# Patient Record
Sex: FEMALE | Race: WHITE | NOT HISPANIC OR LATINO | Employment: FULL TIME | URBAN - METROPOLITAN AREA
[De-identification: names, ages, dates, MRNs, and addresses within clinical notes are randomized per-mention and may not be internally consistent; named-entity substitution may affect disease eponyms.]

---

## 2017-10-10 ENCOUNTER — APPOINTMENT (EMERGENCY)
Dept: RADIOLOGY | Facility: HOSPITAL | Age: 52
End: 2017-10-10
Payer: OTHER MISCELLANEOUS

## 2017-10-10 ENCOUNTER — HOSPITAL ENCOUNTER (EMERGENCY)
Facility: HOSPITAL | Age: 52
Discharge: HOME | End: 2017-10-10
Attending: EMERGENCY MEDICINE | Admitting: EMERGENCY MEDICINE
Payer: OTHER MISCELLANEOUS

## 2017-10-10 VITALS
DIASTOLIC BLOOD PRESSURE: 93 MMHG | OXYGEN SATURATION: 96 % | WEIGHT: 125 LBS | RESPIRATION RATE: 18 BRPM | SYSTOLIC BLOOD PRESSURE: 159 MMHG | TEMPERATURE: 98.5 F | HEART RATE: 92 BPM

## 2017-10-10 DIAGNOSIS — S61.411A LACERATION OF RIGHT HAND: Primary | ICD-10-CM

## 2017-10-10 PROCEDURE — 99283 EMERGENCY DEPT VISIT LOW MDM: CPT

## 2017-10-10 PROCEDURE — 73130 X-RAY EXAM OF HAND: CPT | Performed by: EMERGENCY MEDICINE

## 2017-10-10 PROCEDURE — 90471 IMMUNIZATION ADMIN: CPT

## 2017-10-10 PROCEDURE — 90715 TDAP VACCINE 7 YRS/> IM: CPT | Performed by: NURSE PRACTITIONER

## 2017-10-10 RX ORDER — LIDOCAINE HYDROCHLORIDE 10 MG/ML
5 INJECTION, SOLUTION EPIDURAL; INFILTRATION; INTRACAUDAL; PERINEURAL ONCE
Status: COMPLETED | OUTPATIENT
Start: 2017-10-10 | End: 2017-10-10

## 2017-10-10 RX ORDER — GINSENG 100 MG
1 CAPSULE ORAL ONCE
Status: COMPLETED | OUTPATIENT
Start: 2017-10-10 | End: 2017-10-10

## 2017-10-10 RX ADMIN — LIDOCAINE HYDROCHLORIDE 5 ML: 10 INJECTION, SOLUTION EPIDURAL; INFILTRATION; INTRACAUDAL; PERINEURAL at 01:33

## 2017-10-10 RX ADMIN — TETANUS TOXOID, REDUCED DIPHTHERIA TOXOID AND ACELLULAR PERTUSSIS VACCINE, ADSORBED 0.5 ML: 5; 2.5; 8; 8; 2.5 SUSPENSION INTRAMUSCULAR at 02:06

## 2017-10-10 RX ADMIN — BACITRACIN ZINC 1 LARGE APPLICATION: 500 OINTMENT TOPICAL at 02:04

## 2017-10-10 NOTE — DISCHARGE INSTRUCTIONS
You are to have sutures removed in 7-10 days  Keep clean and dry  Apply bacitracin for 3 days only  See work compensation physician for follow up       Laceration   WHAT YOU NEED TO KNOW:   A laceration is an injury to the skin and the soft tissue underneath it  Lacerations happen when you are cut or hit by something  They can happen anywhere on the body  DISCHARGE INSTRUCTIONS:   Return to the emergency department if:   · You have heavy bleeding or bleeding that does not stop after 10 minutes of holding firm, direct pressure over the wound  · Your wound opens up  Contact your healthcare provider if:   · You have a fever or chills  · Your laceration is red, warm, or swollen  · You have red streaks on your skin coming from your wound  · You have white or yellow drainage from the wound that smells bad  · You have pain that gets worse, even after treatment  · You have questions or concerns about your condition or care  Medicines:   · Prescription pain medicine  may be given  Ask how to take this medicine safely  · Antibiotics  help treat or prevent a bacterial infection  · Take your medicine as directed  Contact your healthcare provider if you think your medicine is not helping or if you have side effects  Tell him or her if you are allergic to any medicine  Keep a list of the medicines, vitamins, and herbs you take  Include the amounts, and when and why you take them  Bring the list or the pill bottles to follow-up visits  Carry your medicine list with you in case of an emergency  Care for your wound as directed:   · Do not get your wound wet  until your healthcare provider says it is okay  Do not soak your wound in water  Do not go swimming until your healthcare provider says it is okay  Carefully wash the wound with soap and water  Gently pat the area dry or allow it to air dry  · Change your bandages  when they get wet, dirty, or after washing   Apply new, clean bandages as directed  Do not apply elastic bandages or tape too tight  Do not put powders or lotions over your incision  · Apply antibiotic ointment as directed  Your healthcare provider may give you antibiotic ointment to put over your wound if you have stitches  If you have strips of tape over your incision, let them dry up and fall off on their own  If they do not fall off within 14 days, gently remove them  If you have glue over your wound, do not remove or pick at it  If your glue comes off, do not replace it with glue that you have at home  · Check your wound every day for signs of infection such as swelling, redness, or pus  Self-care:   · Apply ice  on your wound for 15 to 20 minutes every hour or as directed  Use an ice pack, or put crushed ice in a plastic bag  Cover it with a towel  Ice helps prevent tissue damage and decreases swelling and pain  · Use a splint as directed  A splint will decrease movement and stress on your wound  It may help it heal faster  A splint may be used for lacerations over joints or areas of your body that bend  Ask your healthcare provider how to apply and remove a splint  · Decrease scarring of your wound  by applying ointments as directed  Do not apply ointments until your healthcare provider says it is okay  You may need to wait until your wound is healed  Ask which ointment to buy and how often to use it  After your wound is healed, use sunscreen over the area when you are out in the sun  You should do this for at least 6 months to 1 year after your injury  Follow up with your healthcare provider as directed: You may need to follow up in 24 to 48 hours to have your wound checked for infection  You will need to return in 3 to 14 days if you have stitches or staples so they can be removed  Care for your wound as directed to prevent infection and help it heal  Write down your questions so you remember to ask them during your visits    © 2017 [employer]  Information is for End User's use only and may not be sold, redistributed or otherwise used for commercial purposes  All illustrations and images included in CareNotes® are the copyrighted property of A D A M [employer] Inc  or [employer]  The above information is an  only  It is not intended as medical advice for individual conditions or treatments  Talk to your doctor, nurse or pharmacist before following any medical regimen to see if it is safe and effective for you

## 2017-10-10 NOTE — ED PROVIDER NOTES
History  Chief Complaint   Patient presents with    Hand Laceration     Pt states "I cut my hand on glass at work" Pt unsure of last tetanus     This is a 46year old female who was at work and was working on a bottle line and a bottle broke and cut her right hand  She states she had a glove on the left hand but no glove on the right hand  Unknown tetanus  She states that she does have a piece of metal in her right hand from years ago  History provided by:  Patient   used: No        None       History reviewed  No pertinent past medical history  History reviewed  No pertinent surgical history  History reviewed  No pertinent family history  I have reviewed and agree with the history as documented  Social History   Substance Use Topics    Smoking status: Current Every Day Smoker    Smokeless tobacco: Never Used    Alcohol use Yes      Comment: occasioally        Review of Systems   Constitutional: Negative  HENT: Negative  Eyes: Negative  Respiratory: Negative  Cardiovascular: Negative  Gastrointestinal: Negative  Endocrine: Negative  Genitourinary: Negative  Musculoskeletal: Negative  Skin: Positive for wound  Allergic/Immunologic: Negative  Neurological: Negative  Hematological: Negative  Psychiatric/Behavioral: Negative  Physical Exam  ED Triage Vitals [10/10/17 0104]   Temperature Pulse Respirations Blood Pressure SpO2   98 5 °F (36 9 °C) 92 18 159/93 96 %      Temp src Heart Rate Source Patient Position - Orthostatic VS BP Location FiO2 (%)   -- -- -- -- --      Pain Score       No Pain           Physical Exam   Constitutional: She is oriented to person, place, and time  She appears well-developed and well-nourished  No distress  HENT:   Head: Normocephalic and atraumatic  Eyes: EOM are normal  Pupils are equal, round, and reactive to light  Neck: Normal range of motion  Neck supple     Musculoskeletal: Normal range of motion  She exhibits no edema, tenderness or deformity  Neurological: She is alert and oriented to person, place, and time  She displays normal reflexes  No cranial nerve deficit or sensory deficit  She exhibits normal muscle tone  Coordination normal    Skin: Skin is warm  Capillary refill takes less than 2 seconds  She is not diaphoretic  3 cm laceration on the dorsal surface of the right hand  Extending from base of index finger to base of thumb  Psychiatric: She has a normal mood and affect  Her behavior is normal  Judgment and thought content normal    Nursing note and vitals reviewed  ED Medications  Medications   bacitracin topical ointment 1 large application (not administered)   tetanus-diphtheria-acellular pertussis (BOOSTRIX) IM injection 0 5 mL (not administered)   lidocaine (PF) (XYLOCAINE-MPF) 1 % injection 5 mL (5 mL Infiltration Given by Other 10/10/17 0133)       Diagnostic Studies  Labs Reviewed - No data to display    XR hand 3+ views RIGHT    (Results Pending)       Procedures  Lac Repair  Date/Time: 10/10/2017 1:51 AM  Performed by: Anibal Vick by: Dhiraj Muñoz     Patient location:  ED and bedside  Other Assisting Provider: No    Consent:     Consent obtained:  Verbal, written and emergent situation    Consent given by:  Patient    Risks discussed:  Infection, need for additional repair, pain, poor cosmetic result, retained foreign body, tendon damage, vascular damage, poor wound healing and nerve damage    Alternatives discussed:  No treatment, delayed treatment, observation and referral  Universal protocol:     Procedure explained and questions answered to patient or proxy's satisfaction: yes      Relevant documents present and verified: yes      Test results available and properly labeled: yes      Imaging studies available: yes (no FB seen in the laceration   There is a FB on lateral side of hand not related to this incident )      Required blood products, implants, devices, and special equipment available: yes      Site/side marked: yes      Immediately prior to procedure, a time out was called: yes      Patient identity confirmed:  Verbally with patient, arm band and hospital-assigned identification number  Anesthesia (see MAR for exact dosages): Anesthesia method:  Local infiltration    Local anesthetic:  Lidocaine 1% w/o epi  Laceration details:     Location:  Hand    Hand location:  R hand, dorsum    Length (cm) of Repair:  3    Depth (mm):  0 5  Repair type:     Repair type:  Simple  Pre-procedure details:     Preparation:  Patient was prepped and draped in usual sterile fashion and imaging obtained to evaluate for foreign bodies  Exploration:     Hemostasis achieved with:  Direct pressure    Wound exploration: wound explored through full range of motion and entire depth of wound probed and visualized      Contaminated: no    Treatment:     Area cleansed with:  Betadine and saline    Amount of cleaning:  Standard    Irrigation solution:  Sterile saline    Irrigation method:  Syringe    Visualized foreign bodies/material removed: no    Skin repair:     Repair method:  Sutures    Suture size:  4-0    Suture material:  Nylon    Suture technique:  Simple interrupted    Number of sutures:  7  Approximation:     Approximation:  Close    Approximation difficulty:  Simple    Vermilion border: well-aligned    Post-procedure details:     Dressing:  Antibiotic ointment    Patient tolerance of procedure:   Tolerated well, no immediate complications          Phone Contacts  ED Phone Contact    ED Course  ED Course                                MDM  Number of Diagnoses or Management Options  Diagnosis management comments: Laceration right hand    Suture repair  Boostrix  Xray   Occ Med at bedside        Amount and/or Complexity of Data Reviewed  Tests in the radiology section of CPT®: ordered and reviewed      CritCare Time    Disposition  Final diagnoses:   Laceration of right hand     ED Disposition     ED Disposition Condition Comment    Discharge  Mat Singh discharge to home/self care  Condition at discharge: Good        Follow-up Information     Follow up With Specialties Details Why Contact Info Additional Information       Follow up with Occupational medicine as work instructed       [facility]  If symptoms worsen [address] [facility] ED, [address] [facility] [address]  , [place], [place], [address]        Patient's Medications    No medications on file     No discharge procedures on file      ED Provider  Electronically Signed by       MELA Meeks  10/10/17 0205

## 2017-10-21 ENCOUNTER — OFFICE VISIT (OUTPATIENT)
Dept: URGENT CARE | Facility: CLINIC | Age: 52
End: 2017-10-21
Payer: OTHER MISCELLANEOUS

## 2017-10-21 PROCEDURE — 99211 OFF/OP EST MAY X REQ PHY/QHP: CPT

## 2020-06-23 ENCOUNTER — TELEPHONE (OUTPATIENT)
Dept: FAMILY MEDICINE CLINIC | Facility: CLINIC | Age: 55
End: 2020-06-23

## 2020-06-23 ENCOUNTER — OFFICE VISIT (OUTPATIENT)
Dept: URGENT CARE | Facility: CLINIC | Age: 55
End: 2020-06-23
Payer: COMMERCIAL

## 2020-06-23 VITALS
OXYGEN SATURATION: 97 % | RESPIRATION RATE: 18 BRPM | SYSTOLIC BLOOD PRESSURE: 164 MMHG | HEART RATE: 96 BPM | HEIGHT: 69 IN | TEMPERATURE: 97.7 F | BODY MASS INDEX: 23.7 KG/M2 | WEIGHT: 160 LBS | DIASTOLIC BLOOD PRESSURE: 98 MMHG

## 2020-06-23 DIAGNOSIS — R53.83 FATIGUE, UNSPECIFIED TYPE: ICD-10-CM

## 2020-06-23 DIAGNOSIS — I10 ESSENTIAL HYPERTENSION: Primary | ICD-10-CM

## 2020-06-23 PROCEDURE — 99203 OFFICE O/P NEW LOW 30 MIN: CPT | Performed by: FAMILY MEDICINE

## 2020-06-23 RX ORDER — LISINOPRIL 20 MG/1
20 TABLET ORAL DAILY
Qty: 15 TABLET | Refills: 0 | Status: SHIPPED | OUTPATIENT
Start: 2020-06-23 | End: 2020-07-01 | Stop reason: SDUPTHER

## 2020-07-01 ENCOUNTER — OFFICE VISIT (OUTPATIENT)
Dept: FAMILY MEDICINE CLINIC | Facility: CLINIC | Age: 55
End: 2020-07-01
Payer: COMMERCIAL

## 2020-07-01 VITALS
RESPIRATION RATE: 18 BRPM | DIASTOLIC BLOOD PRESSURE: 74 MMHG | HEART RATE: 94 BPM | SYSTOLIC BLOOD PRESSURE: 126 MMHG | OXYGEN SATURATION: 97 % | BODY MASS INDEX: 23.99 KG/M2 | WEIGHT: 162 LBS | HEIGHT: 69 IN | TEMPERATURE: 97.6 F

## 2020-07-01 DIAGNOSIS — Z78.0 ASYMPTOMATIC POSTMENOPAUSAL STATE: ICD-10-CM

## 2020-07-01 DIAGNOSIS — Z12.31 ENCOUNTER FOR SCREENING MAMMOGRAM FOR BREAST CANCER: ICD-10-CM

## 2020-07-01 DIAGNOSIS — Z23 ENCOUNTER FOR IMMUNIZATION: ICD-10-CM

## 2020-07-01 DIAGNOSIS — Z00.00 ANNUAL PHYSICAL EXAM: Primary | ICD-10-CM

## 2020-07-01 DIAGNOSIS — Z12.12 SCREENING FOR COLORECTAL CANCER: ICD-10-CM

## 2020-07-01 DIAGNOSIS — Z11.59 ENCOUNTER FOR HEPATITIS C SCREENING TEST FOR LOW RISK PATIENT: ICD-10-CM

## 2020-07-01 DIAGNOSIS — Z12.39 SCREENING FOR BREAST CANCER: ICD-10-CM

## 2020-07-01 DIAGNOSIS — Z12.11 SCREENING FOR COLORECTAL CANCER: ICD-10-CM

## 2020-07-01 DIAGNOSIS — Z11.4 SCREENING FOR HIV (HUMAN IMMUNODEFICIENCY VIRUS): ICD-10-CM

## 2020-07-01 DIAGNOSIS — Z72.0 TOBACCO ABUSE: ICD-10-CM

## 2020-07-01 DIAGNOSIS — Z12.11 SCREENING FOR COLON CANCER: ICD-10-CM

## 2020-07-01 DIAGNOSIS — R68.89 HEAT INTOLERANCE: ICD-10-CM

## 2020-07-01 DIAGNOSIS — I10 ESSENTIAL HYPERTENSION: ICD-10-CM

## 2020-07-01 DIAGNOSIS — E55.9 VITAMIN D DEFICIENCY: ICD-10-CM

## 2020-07-01 DIAGNOSIS — Z11.59 NEED FOR HEPATITIS C SCREENING TEST: ICD-10-CM

## 2020-07-01 PROCEDURE — 3008F BODY MASS INDEX DOCD: CPT | Performed by: INTERNAL MEDICINE

## 2020-07-01 PROCEDURE — 90732 PPSV23 VACC 2 YRS+ SUBQ/IM: CPT

## 2020-07-01 PROCEDURE — 3074F SYST BP LT 130 MM HG: CPT | Performed by: INTERNAL MEDICINE

## 2020-07-01 PROCEDURE — 3078F DIAST BP <80 MM HG: CPT | Performed by: INTERNAL MEDICINE

## 2020-07-01 PROCEDURE — 99214 OFFICE O/P EST MOD 30 MIN: CPT | Performed by: INTERNAL MEDICINE

## 2020-07-01 PROCEDURE — 90471 IMMUNIZATION ADMIN: CPT

## 2020-07-01 PROCEDURE — 99386 PREV VISIT NEW AGE 40-64: CPT | Performed by: INTERNAL MEDICINE

## 2020-07-01 PROCEDURE — 4004F PT TOBACCO SCREEN RCVD TLK: CPT | Performed by: INTERNAL MEDICINE

## 2020-07-01 RX ORDER — LISINOPRIL 20 MG/1
20 TABLET ORAL DAILY
Qty: 90 TABLET | Refills: 1 | Status: SHIPPED | OUTPATIENT
Start: 2020-07-01 | End: 2020-08-05 | Stop reason: SDUPTHER

## 2020-07-01 NOTE — PROGRESS NOTES
[facility]    NAME: Edgardo Moe  AGE: 47 y o  SEX: female  : 1965     DATE: 2020     Assessment and Plan:     Problem List Items Addressed This Visit     None      Visit Diagnoses     Annual physical exam    -  Primary    Screening for colon cancer        Encounter for hepatitis C screening test for low risk patient        Screening for breast cancer        Essential hypertension        Relevant Medications    lisinopril (ZESTRIL) 20 mg tablet    Other Relevant Orders    Lipid panel    Hemoglobin A1C    CBC and Platelet    Comprehensive metabolic panel    UA (URINE) with reflex to Scope    Heat intolerance        Relevant Orders    TSH, 3rd generation with Free T4 reflex    Vitamin D deficiency        Relevant Orders    Vitamin D 25 hydroxy    Encounter for screening mammogram for breast cancer        Relevant Orders    Mammo screening bilateral w 3d & cad    Screening for colorectal cancer        Relevant Orders    Cologuard    Screening for HIV (human immunodeficiency virus)        Relevant Orders    HIV 1/2 Antigen/Antibody (4th Generation) w Reflex [facility]    Need for hepatitis C screening test        Relevant Orders    Hepatitis C antibody    Asymptomatic postmenopausal state        Relevant Orders    DXA bone density spine hip and pelvis    Encounter for immunization        Relevant Orders    PNEUMOCOCCAL POLYSACCHARIDE VACCINE 23-VALENT =>1YO SQ IM (Completed)          Immunizations and preventive care screenings were discussed with patient today  Appropriate education was printed on patient's after visit summary  Counseling:  Alcohol/drug use: discussed moderation in alcohol intake, the recommendations for healthy alcohol use, and avoidance of illicit drug use  Dental Health: discussed importance of regular tooth brushing, flossing, and dental visits    Injury prevention: discussed safety/seat belts, safety helmets, smoke detectors, carbon dioxide detectors, and smoking near bedding or upholstery  Sexual health: discussed sexually transmitted diseases, partner selection, use of condoms, avoidance of unintended pregnancy, and contraceptive alternatives  · Exercise: the importance of regular exercise/physical activity was discussed  Recommend exercise 3-5 times per week for at least 30 minutes  Tobacco Cessation Counseling: Tobacco cessation counseling was provided  The patient is sincerely urged to quit consumption of tobacco  She is not ready to quit tobacco  Medication options and side effects of medication discussed  Patient refused medication  Tobacco use screening or tobacco cessation counseling was not performed due to other medical reasons  Not ready to quit at this time      Return in about 1 month (around 8/1/2020) for pap  Chief Complaint:     Chief Complaint   Patient presents with   [address] Hypertension    Colonoscopy     never had a colonoscopy done before interested in cologuard       History of Present Illness:     Adult Annual Physical   Patient here for a comprehensive physical exam  The patient reports problems - refer to separate progress note  Diet and Physical Activity  · Diet/Nutrition: poor diet  · Exercise: no formal exercise  Depression Screening  PHQ-9 Depression Screening    PHQ-9:    Frequency of the following problems over the past two weeks:       Little interest or pleasure in doing things:  0 - not at all  Feeling down, depressed, or hopeless:  0 - not at all  PHQ-2 Score:  0       General Health  · Sleep: sleeps well  · Hearing: normal - bilateral   · Vision: goes for regular eye exams  · Dental: regular dental visits  /GYN Health  · Patient is: postmenopausal  · Last menstrual period: 5 yrs ago  · Contraceptive method: abstinence       Review of Systems:     Review of Systems   Refer to separate progress note     Past Medical History: Past Medical History:   Diagnosis Date    Anxiety     Hypertension       Past Surgical History:     Past Surgical History:   Procedure Laterality Date    BREAST LUMPECTOMY      EYE SURGERY      TUBAL LIGATION        Social History:        Social History     Socioeconomic History    Marital status: Single     Spouse name: None    Number of children: None    Years of education: None    Highest education level: None   Occupational History    None   Social Needs    Financial resource strain: None    Food insecurity:     Worry: None     Inability: None    Transportation needs:     Medical: None     Non-medical: None   Tobacco Use    Smoking status: Current Every Day Smoker     Packs/day: 2 00    Smokeless tobacco: Never Used   Substance and Sexual Activity    Alcohol use: Yes     Comment: 3 beer/day    Drug use: No    Sexual activity: None   Lifestyle    Physical activity:     Days per week: None     Minutes per session: None    Stress: None   Relationships    Social connections:     Talks on phone: None     Gets together: None     Attends [employer] service: None     Active member of club or organization: None     Attends meetings of clubs or organizations: None     Relationship status: None    Intimate partner violence:     Fear of current or ex partner: None     Emotionally abused: None     Physically abused: None     Forced sexual activity: None   Other Topics Concern    None   Social History Narrative    None      Family History:     Family History   Problem Relation Age of Onset    Heart disease Mother     Hypertension Mother     Cancer Mother     Heart disease Father     Hypertension Father     Aneurysm Father       Current Medications:     Current Outpatient Medications   Medication Sig Dispense Refill    lisinopril (ZESTRIL) 20 mg tablet Take 1 tablet (20 mg total) by mouth daily 90 tablet 1     No current facility-administered medications for this visit         Allergies:     No Known Allergies   Physical Exam:     /74   Pulse 94   Temp 97 6 °F (36 4 °C)   Resp 18   Ht 5' 9" (1 753 m)   Wt 73 5 kg (162 lb)   SpO2 97%   BMI 23 92 kg/m²     Physical Exam  Refer to separate progress note    Yogi Pham MD  [place] [address]

## 2020-07-01 NOTE — PROGRESS NOTES
[facility]        NAME: Adriano Gates is a 47 y o  female  : 1965    MRN: [id number]  DATE: 2020  TIME: 5:10 AM    Assessment and Plan     Problem List Items Addressed This Visit        Cardiovascular and Mediastinum    Essential hypertension - Primary    Bp improved today, continue lisinopril  Check labs for risk assessment  Discussed lifestyle changes like smoking cessation, limiting alcohol, low sodium diet  Relevant Medications    lisinopril (ZESTRIL) 20 mg tablet    Other Relevant Orders    Lipid panel    Hemoglobin A1C    CBC and Platelet    Comprehensive metabolic panel    UA (URINE) with reflex to Scope       Other    Tobacco abuse  Not interested in quitting at this time  40+ pack year history, qualifies for LDCT lung cancer screening once she turns 49yo (December), will order at follow up         Other Visit Diagnoses     Screening for colon cancer        Encounter for hepatitis C screening test for low risk patient        Screening for breast cancer        Heat intolerance        Relevant Orders    TSH, 3rd generation with Free T4 reflex    Vitamin D deficiency        Relevant Orders    Vitamin D 25 hydroxy    Annual physical exam        Encounter for screening mammogram for breast cancer        Relevant Orders    Mammo screening bilateral w 3d & cad    Screening for colorectal cancer        Relevant Orders    Cologuard    Screening for HIV (human immunodeficiency virus)        Relevant Orders    HIV 1/2 Antigen/Antibody (4th Generation) w Reflex [facility]    Need for hepatitis C screening test        Relevant Orders    Hepatitis C antibody    Asymptomatic postmenopausal state        Relevant Orders    DXA bone density spine hip and pelvis    Encounter for immunization        Relevant Orders    PNEUMOCOCCAL POLYSACCHARIDE VACCINE 23-VALENT =>3YO SQ IM (Completed)               Chief Complaint     Chief Complaint   Patient presents with    Establish Care    Hypertension  Colonoscopy     never had a colonoscopy done before interested in cologuard          History of Present Illness       Here as new patient to establish care  Previous Dr Ismael Metzger patient  HTN: diagnosed about 10 yrs ago during dental visit  Hasn't seen PCP in years, ran out of lisinopril  Was having headaches, dizziness, diaphoresis last week and went to urgent care, Bp was elevated 190-200s  Pt was given limited supply of lisinopril and referred here  Feeling better since restarting, denies chest pain, dyspnea, leg swelling or SOB  Fhx notable for HTN and heart disease in both parents  Rt breast lumpectomy for benign lesion age 25s  Has never had screening mammogram      Social hx: smokes 1 5ppd since age 6yo  Has tried to quit with gum, patches, and chantix without success  Not interested in quitting at this time  Drinks 2-3 beers daily  Works at [employer]  Denies illicit drug use  Not sexually active currently, prefers men only  Health maintenance: has never had screening for colon cancer, no known family history  Last pap smear > 10 yrs ago  Review of Systems   Review of Systems   Constitutional: Negative for chills, fatigue, fever and unexpected weight change  HENT: Negative for congestion, postnasal drip, rhinorrhea, sore throat and trouble swallowing  Eyes: Negative for pain, redness, itching and visual disturbance  Respiratory: Negative for cough, chest tightness, shortness of breath and wheezing  Cardiovascular: Negative for chest pain, palpitations and leg swelling  Gastrointestinal: Negative for abdominal pain, blood in stool, constipation, diarrhea and nausea  Endocrine: Negative for cold intolerance, heat intolerance, polydipsia and polyuria  Genitourinary: Negative for dysuria, frequency, hematuria and urgency  Musculoskeletal: Positive for arthralgias and joint swelling  Negative for back pain  Skin: Negative for rash     Neurological: Negative for dizziness, tremors, weakness, light-headedness, numbness and headaches  Psychiatric/Behavioral: Negative for confusion, dysphoric mood, hallucinations and suicidal ideas  The patient is not nervous/anxious  Current Medications       Current Outpatient Medications:     lisinopril (ZESTRIL) 20 mg tablet, Take 1 tablet (20 mg total) by mouth daily, Disp: 90 tablet, Rfl: 1    Current Allergies     Allergies as of 07/01/2020    (No Known Allergies)            The following portions of the patient's history were reviewed and updated as appropriate: allergies, current medications, past family history, past medical history, past social history, past surgical history and problem list      Past Medical History:   Diagnosis Date    Anxiety     Hypertension        Past Surgical History:   Procedure Laterality Date    BREAST LUMPECTOMY      EYE SURGERY      TUBAL LIGATION         Family History   Problem Relation Age of Onset    Heart disease Mother     Hypertension Mother     Cancer Mother     Heart disease Father     Hypertension Father     Aneurysm Father          Medications have been verified  Objective   /74   Pulse 94   Temp 97 6 °F (36 4 °C)   Resp 18   Ht 5' 9" (1 753 m)   Wt 73 5 kg (162 lb)   SpO2 97%   BMI 23 92 kg/m²        Physical Exam     Physical Exam   Constitutional: She is oriented to person, place, and time  She appears well-developed and well-nourished  No distress  HENT:   Head: Normocephalic and atraumatic  Right Ear: External ear normal    Left Ear: External ear normal    Mouth/Throat: Oropharynx is clear and moist  No oropharyngeal exudate  Eyes: Conjunctivae and EOM are normal  No scleral icterus  Neck: Normal range of motion  Neck supple  No JVD present  No thyromegaly present  Cardiovascular: Normal rate, regular rhythm, normal heart sounds and intact distal pulses  Exam reveals no gallop and no friction rub  No murmur heard    Pulmonary/Chest: Effort normal and breath sounds normal  No respiratory distress  She has no wheezes  She has no rales  She exhibits no tenderness  Abdominal: Soft  Bowel sounds are normal  She exhibits no distension and no mass  There is no tenderness  There is no rebound and no guarding  No hernia  Musculoskeletal: Normal range of motion  She exhibits tenderness and deformity  Bony enlargement of PIP & DIPs bilateral hands, hallux valgus deformity of left foot   Lymphadenopathy:     She has no cervical adenopathy  Neurological: She is alert and oriented to person, place, and time  Skin: Skin is warm and dry  Capillary refill takes less than 2 seconds  Psychiatric: She has a normal mood and affect   Her behavior is normal  Judgment and thought content normal            Results:  No results found for: SODIUM, K, CL, CO2, BUN, CREATININE, GLUC, CALCIUM    No results found for: HGBA1C    No results found for: WBC, HGB, HCT, MCV, PLT

## 2020-07-01 NOTE — PATIENT INSTRUCTIONS
Wellness Visit for Adults   AMBULATORY CARE:   A wellness visit  is when you see your healthcare provider to get screened for health problems  You can also get advice on how to stay healthy  Write down your questions so you remember to ask them  Ask your healthcare provider how often you should have a wellness visit  What happens at a wellness visit:  Your healthcare provider will ask about your health, and your family history of health problems  This includes high blood pressure, heart disease, and cancer  He or she will ask if you have symptoms that concern you, if you smoke, and about your mood  You may also be asked about your intake of medicines, supplements, food, and alcohol  Any of the following may be done:  · Your weight  will be checked  Your height may also be checked so your body mass index (BMI) can be calculated  Your BMI shows if you are at a healthy weight  · Your blood pressure  and heart rate will be checked  Your temperature may also be checked  · Blood and urine tests  may be done  Blood tests may be done to check your cholesterol levels  Abnormal cholesterol levels increase your risk for heart disease and stroke  You may also need a blood or urine test to check for diabetes if you are at increased risk  Urine tests may be done to look for signs of an infection or kidney disease  · A physical exam  includes checking your heartbeat and lungs with a stethoscope  Your healthcare provider may also check your skin to look for sun damage  · Screening tests  may be recommended  A screening test is done to check for diseases that may not cause symptoms  The screening tests you may need depend on your age, gender, family history, and lifestyle habits  For example, colorectal screening may be recommended if you are 48years old or older  Screening tests you need if you are a woman:   · A Pap smear  is used to screen for cervical cancer   Pap smears are usually done every 3 to 5 years depending on your age  You may need them more often if you have had abnormal Pap smear test results in the past  Ask your healthcare provider how often you should have a Pap smear  · A mammogram  is an x-ray of your breasts to screen for breast cancer  Experts recommend mammograms every 2 years starting at age 48 years  You may need a mammogram at age 52 years or younger if you have an increased risk for breast cancer  Talk to your healthcare provider about when you should start having mammograms and how often you need them  Vaccines you may need:   · Get an influenza vaccine  every year  The influenza vaccine protects you from the flu  Several types of viruses cause the flu  The viruses change over time, so new vaccines are made each year  · Get a tetanus-diphtheria (Td) booster vaccine  every 10 years  This vaccine protects you against tetanus and diphtheria  Tetanus is a severe infection that may cause painful muscle spasms and lockjaw  Diphtheria is a severe bacterial infection that causes a thick covering in the back of your mouth and throat  · Get a human papillomavirus (HPV) vaccine  if you are female and aged 23 to 32 or male 23 to 24 and never received it  This vaccine protects you from HPV infection  HPV is the most common infection spread by sexual contact  HPV may also cause vaginal, penile, and anal cancers  · Get a pneumococcal vaccine  if you are aged 72 years or older  The pneumococcal vaccine is an injection given to protect you from pneumococcal disease  Pneumococcal disease is an infection caused by pneumococcal bacteria  The infection may cause pneumonia, meningitis, or an ear infection  · Get a shingles vaccine  if you are aged 61 or older, even if you have had shingles before  The shingles vaccine is an injection to protect you from the varicella-zoster virus  This is the same virus that causes chickenpox   Shingles is a painful rash that develops in people who had chickenpox or have been exposed to the virus  How to eat healthy:  My Plate is a model for planning healthy meals  It shows the types and amounts of foods that should go on your plate  Fruits and vegetables make up about half of your plate, and grains and protein make up the other half  A serving of dairy is included on the side of your plate  The amount of calories and serving sizes you need depends on your age, gender, weight, and height  Examples of healthy foods are listed below:  · Eat a variety of vegetables  such as dark green, red, and orange vegetables  You can also include canned vegetables low in sodium (salt) and frozen vegetables without added butter or sauces  · Eat a variety of fresh fruits , canned fruit in 100% juice, frozen fruit, and dried fruit  · Include whole grains  At least half of the grains you eat should be whole grains  Examples include whole-wheat bread, wheat pasta, brown rice, and whole-grain cereals such as oatmeal     · Eat a variety of protein foods such as seafood (fish and shellfish), lean meat, and poultry without skin (turkey and chicken)  Examples of lean meats include pork leg, shoulder, or tenderloin, and beef round, sirloin, tenderloin, and extra lean ground beef  Other protein foods include eggs and egg substitutes, beans, peas, soy products, nuts, and seeds  · Choose low-fat dairy products such as skim or 1% milk or low-fat yogurt, cheese, and cottage cheese  · Limit unhealthy fats  such as butter, hard margarine, and shortening  Exercise:  Exercise at least 30 minutes per day on most days of the week  Some examples of exercise include walking, biking, dancing, and swimming  You can also fit in more physical activity by taking the stairs instead of the elevator or parking farther away from stores  Include muscle strengthening activities 2 days each week  Regular exercise provides many health benefits   It helps you manage your weight, and decreases your risk for type 2 diabetes, heart disease, stroke, and high blood pressure  Exercise can also help improve your mood  Ask your healthcare provider about the best exercise plan for you  General health and safety guidelines:   · Do not smoke  Nicotine and other chemicals in cigarettes and cigars can cause lung damage  Ask your healthcare provider for information if you currently smoke and need help to quit  E-cigarettes or smokeless tobacco still contain nicotine  Talk to your healthcare provider before you use these products  · Limit alcohol  A drink of alcohol is 12 ounces of beer, 5 ounces of wine, or 1½ ounces of liquor  · Lose weight, if needed  Being overweight increases your risk of certain health conditions  These include heart disease, high blood pressure, type 2 diabetes, and certain types of cancer  · Protect your skin  Do not sunbathe or use tanning beds  Use sunscreen with a SPF 15 or higher  Apply sunscreen at least 15 minutes before you go outside  Reapply sunscreen every 2 hours  Wear protective clothing, hats, and sunglasses when you are outside  · Drive safely  Always wear your seatbelt  Make sure everyone in your car wears a seatbelt  A seatbelt can save your life if you are in an accident  Do not use your cell phone when you are driving  This could distract you and cause an accident  Pull over if you need to make a call or send a text message  · Practice safe sex  Use latex condoms if are sexually active and have more than one partner  Your healthcare provider may recommend screening tests for sexually transmitted infections (STIs)  · Wear helmets, lifejackets, and protective gear  Always wear a helmet when you ride a bike or motorcycle, go skiing, or play sports that could cause a head injury  Wear protective equipment when you play sports  Wear a lifejacket when you are on a boat or doing water sports    © 2017 [employer] Information is for End User's use only and may not be sold, redistributed or otherwise used for commercial purposes  All illustrations and images included in CareNotes® are the copyrighted property of [employer]  or [employer]  The above information is an  only  It is not intended as medical advice for individual conditions or treatments  Talk to your doctor, nurse or pharmacist before following any medical regimen to see if it is safe and effective for you  Wellness Visit for Adults   AMBULATORY CARE:   A wellness visit  is when you see your healthcare provider to get screened for health problems  You can also get advice on how to stay healthy  Write down your questions so you remember to ask them  Ask your healthcare provider how often you should have a wellness visit  What happens at a wellness visit:  Your healthcare provider will ask about your health, and your family history of health problems  This includes high blood pressure, heart disease, and cancer  He or she will ask if you have symptoms that concern you, if you smoke, and about your mood  You may also be asked about your intake of medicines, supplements, food, and alcohol  Any of the following may be done:  · Your weight  will be checked  Your height may also be checked so your body mass index (BMI) can be calculated  Your BMI shows if you are at a healthy weight  · Your blood pressure  and heart rate will be checked  Your temperature may also be checked  · Blood and urine tests  may be done  Blood tests may be done to check your cholesterol levels  Abnormal cholesterol levels increase your risk for heart disease and stroke  You may also need a blood or urine test to check for diabetes if you are at increased risk  Urine tests may be done to look for signs of an infection or kidney disease  · A physical exam  includes checking your heartbeat and lungs with a stethoscope   Your healthcare provider may also check your skin to look for sun damage  · Screening tests  may be recommended  A screening test is done to check for diseases that may not cause symptoms  The screening tests you may need depend on your age, gender, family history, and lifestyle habits  For example, colorectal screening may be recommended if you are 48years old or older  Screening tests you need if you are a woman:   · A Pap smear  is used to screen for cervical cancer  Pap smears are usually done every 3 to 5 years depending on your age  You may need them more often if you have had abnormal Pap smear test results in the past  Ask your healthcare provider how often you should have a Pap smear  · A mammogram  is an x-ray of your breasts to screen for breast cancer  Experts recommend mammograms every 2 years starting at age 48 years  You may need a mammogram at age 52 years or younger if you have an increased risk for breast cancer  Talk to your healthcare provider about when you should start having mammograms and how often you need them  Vaccines you may need:   · Get an influenza vaccine  every year  The influenza vaccine protects you from the flu  Several types of viruses cause the flu  The viruses change over time, so new vaccines are made each year  · Get a tetanus-diphtheria (Td) booster vaccine  every 10 years  This vaccine protects you against tetanus and diphtheria  Tetanus is a severe infection that may cause painful muscle spasms and lockjaw  Diphtheria is a severe bacterial infection that causes a thick covering in the back of your mouth and throat  · Get a human papillomavirus (HPV) vaccine  if you are female and aged 23 to 32 or male 23 to 24 and never received it  This vaccine protects you from HPV infection  HPV is the most common infection spread by sexual contact  HPV may also cause vaginal, penile, and anal cancers  · Get a pneumococcal vaccine  if you are aged 72 years or older   The pneumococcal vaccine is an injection given to protect you from pneumococcal disease  Pneumococcal disease is an infection caused by pneumococcal bacteria  The infection may cause pneumonia, meningitis, or an ear infection  · Get a shingles vaccine  if you are aged 61 or older, even if you have had shingles before  The shingles vaccine is an injection to protect you from the varicella-zoster virus  This is the same virus that causes chickenpox  Shingles is a painful rash that develops in people who had chickenpox or have been exposed to the virus  How to eat healthy:  My Plate is a model for planning healthy meals  It shows the types and amounts of foods that should go on your plate  Fruits and vegetables make up about half of your plate, and grains and protein make up the other half  A serving of dairy is included on the side of your plate  The amount of calories and serving sizes you need depends on your age, gender, weight, and height  Examples of healthy foods are listed below:  · Eat a variety of vegetables  such as dark green, red, and orange vegetables  You can also include canned vegetables low in sodium (salt) and frozen vegetables without added butter or sauces  · Eat a variety of fresh fruits , canned fruit in 100% juice, frozen fruit, and dried fruit  · Include whole grains  At least half of the grains you eat should be whole grains  Examples include whole-wheat bread, wheat pasta, brown rice, and whole-grain cereals such as oatmeal     · Eat a variety of protein foods such as seafood (fish and shellfish), lean meat, and poultry without skin (turkey and chicken)  Examples of lean meats include pork leg, shoulder, or tenderloin, and beef round, sirloin, tenderloin, and extra lean ground beef  Other protein foods include eggs and egg substitutes, beans, peas, soy products, nuts, and seeds  · Choose low-fat dairy products such as skim or 1% milk or low-fat yogurt, cheese, and cottage cheese       · Limit unhealthy fats  such as butter, hard margarine, and shortening  Exercise:  Exercise at least 30 minutes per day on most days of the week  Some examples of exercise include walking, biking, dancing, and swimming  You can also fit in more physical activity by taking the stairs instead of the elevator or parking farther away from stores  Include muscle strengthening activities 2 days each week  Regular exercise provides many health benefits  It helps you manage your weight, and decreases your risk for type 2 diabetes, heart disease, stroke, and high blood pressure  Exercise can also help improve your mood  Ask your healthcare provider about the best exercise plan for you  General health and safety guidelines:   · Do not smoke  Nicotine and other chemicals in cigarettes and cigars can cause lung damage  Ask your healthcare provider for information if you currently smoke and need help to quit  E-cigarettes or smokeless tobacco still contain nicotine  Talk to your healthcare provider before you use these products  · Limit alcohol  A drink of alcohol is 12 ounces of beer, 5 ounces of wine, or 1½ ounces of liquor  · Lose weight, if needed  Being overweight increases your risk of certain health conditions  These include heart disease, high blood pressure, type 2 diabetes, and certain types of cancer  · Protect your skin  Do not sunbathe or use tanning beds  Use sunscreen with a SPF 15 or higher  Apply sunscreen at least 15 minutes before you go outside  Reapply sunscreen every 2 hours  Wear protective clothing, hats, and sunglasses when you are outside  · Drive safely  Always wear your seatbelt  Make sure everyone in your car wears a seatbelt  A seatbelt can save your life if you are in an accident  Do not use your cell phone when you are driving  This could distract you and cause an accident  Pull over if you need to make a call or send a text message  · Practice safe sex    Use latex condoms if are sexually active and have more than one partner  Your healthcare provider may recommend screening tests for sexually transmitted infections (STIs)  · Wear helmets, lifejackets, and protective gear  Always wear a helmet when you ride a bike or motorcycle, go skiing, or play sports that could cause a head injury  Wear protective equipment when you play sports  Wear a lifejacket when you are on a boat or doing water sports  © 2017 [employer]  Information is for End User's use only and may not be sold, redistributed or otherwise used for commercial purposes  All illustrations and images included in CareNotes® are the copyrighted property of [employer]  or Gil Alfonso  The above information is an  only  It is not intended as medical advice for individual conditions or treatments  Talk to your doctor, nurse or pharmacist before following any medical regimen to see if it is safe and effective for you  Cigarette Smoking and Your Health   AMBULATORY CARE:   Risks to your health if you smoke:  Nicotine and other chemicals found in tobacco damage every cell in your body  Even if you are a light smoker, you have an increased risk for cancer, heart disease, and lung disease  If you are pregnant or have diabetes, smoking increases your risk for complications  Benefits to your health if you stop smoking:   · You decrease respiratory symptoms such as coughing, wheezing, and shortness of breath  · You reduce your risk for cancers of the lung, mouth, throat, kidney, bladder, pancreas, stomach, and cervix  If you already have cancer, you increase the benefits of chemotherapy  You also reduce your risk for cancer returning or a second cancer from developing  · You reduce your risk for heart disease, blood clots, heart attack, and stroke  · You reduce your risk for lung infections, and diseases such as pneumonia, asthma, chronic bronchitis, and emphysema  · Your circulation improves   More oxygen can be delivered to your body  If you have diabetes, you lower your risk for complications, such as kidney, artery, and eye diseases  You also lower your risk for nerve damage  Nerve damage can lead to amputations, poor vision, and blindness  · You improve your body's ability to heal and to fight infections  Benefits to the health of others if you stop smoking:  Tobacco is harmful to nonsmokers who breathe in your secondhand smoke  The following are ways the health of others around you may improve when you stop smoking:  · You lower the risks for lung cancer and heart disease in nonsmoking adults  · If you are pregnant, you lower the risk for miscarriage, early delivery, low birth weight, and stillbirth  You also lower your baby's risk for SIDS, obesity, developmental delay, and neurobehavioral problems, such as ADHD  · If you have children, you lower their risk for ear infections, colds, pneumonia, bronchitis, and asthma  For more information and support to stop smoking:   · [employer]  gov  Phone: 0- 136 - 355-1072  Web Address: www [employer]  Follow up with your healthcare provider as directed:  Write down your questions so you remember to ask them during your visits  © 2017 [employer] [employer] Information is for End User's use only and may not be sold, redistributed or otherwise used for commercial purposes  All illustrations and images included in CareNotes® are the copyrighted property of A [employer] M [employer]  or [employer]fon[employer]  The above information is an  only  It is not intended as medical advice for individual conditions or treatments  Talk to your doctor, nurse or pharmacist before following any medical regimen to see if it is safe and effective for you

## 2020-07-02 PROBLEM — I10 ESSENTIAL HYPERTENSION: Status: ACTIVE | Noted: 2020-07-02

## 2020-07-02 PROBLEM — Z72.0 TOBACCO ABUSE: Status: ACTIVE | Noted: 2020-07-02

## 2020-07-15 ENCOUNTER — HOSPITAL ENCOUNTER (OUTPATIENT)
Dept: BONE DENSITY | Facility: HOSPITAL | Age: 55
Discharge: HOME | End: 2020-07-15
Attending: INTERNAL MEDICINE
Payer: COMMERCIAL

## 2020-07-15 ENCOUNTER — HOSPITAL ENCOUNTER (OUTPATIENT)
Dept: MAMMOGRAPHY | Facility: HOSPITAL | Age: 55
Discharge: HOME | End: 2020-07-15
Attending: INTERNAL MEDICINE
Payer: COMMERCIAL

## 2020-07-15 VITALS — WEIGHT: 160 LBS | HEIGHT: 69 IN | BODY MASS INDEX: 23.7 KG/M2

## 2020-07-15 DIAGNOSIS — Z78.0 ASYMPTOMATIC POSTMENOPAUSAL STATE: ICD-10-CM

## 2020-07-15 DIAGNOSIS — Z12.31 ENCOUNTER FOR SCREENING MAMMOGRAM FOR BREAST CANCER: ICD-10-CM

## 2020-07-15 PROCEDURE — 77067 SCR MAMMO BI INCL CAD: CPT

## 2020-07-15 PROCEDURE — 77063 BREAST TOMOSYNTHESIS BI: CPT

## 2020-07-15 PROCEDURE — 77080 DXA BONE DENSITY AXIAL: CPT

## 2020-07-16 ENCOUNTER — LAB (OUTPATIENT)
Dept: LAB | Facility: CLINIC | Age: 55
End: 2020-07-16
Payer: COMMERCIAL

## 2020-07-16 DIAGNOSIS — E55.9 VITAMIN D DEFICIENCY: ICD-10-CM

## 2020-07-16 DIAGNOSIS — Z11.59 NEED FOR HEPATITIS C SCREENING TEST: ICD-10-CM

## 2020-07-16 DIAGNOSIS — Z11.4 SCREENING FOR HIV (HUMAN IMMUNODEFICIENCY VIRUS): ICD-10-CM

## 2020-07-16 DIAGNOSIS — R68.89 HEAT INTOLERANCE: ICD-10-CM

## 2020-07-16 DIAGNOSIS — I10 ESSENTIAL HYPERTENSION: ICD-10-CM

## 2020-07-16 LAB
25(OH)D3 SERPL-MCNC: 9.8 NG/ML (ref 30–100)
ALBUMIN SERPL BCP-MCNC: 3.7 G/DL (ref 3.5–5)
ALP SERPL-CCNC: 88 U/L (ref 46–116)
ALT SERPL W P-5'-P-CCNC: 61 U/L (ref 12–78)
ANION GAP SERPL CALCULATED.3IONS-SCNC: 5 MMOL/L (ref 4–13)
AST SERPL W P-5'-P-CCNC: 49 U/L (ref 5–45)
BACTERIA UR QL AUTO: ABNORMAL /HPF
BILIRUB SERPL-MCNC: 0.33 MG/DL (ref 0.2–1)
BILIRUB UR QL STRIP: NEGATIVE
BUN SERPL-MCNC: 4 MG/DL (ref 5–25)
CALCIUM SERPL-MCNC: 9.1 MG/DL (ref 8.3–10.1)
CHLORIDE SERPL-SCNC: 102 MMOL/L (ref 100–108)
CHOLEST SERPL-MCNC: 218 MG/DL (ref 50–200)
CLARITY UR: ABNORMAL
CO2 SERPL-SCNC: 26 MMOL/L (ref 21–32)
COLOR UR: YELLOW
CREAT SERPL-MCNC: 0.6 MG/DL (ref 0.6–1.3)
ERYTHROCYTE [DISTWIDTH] IN BLOOD BY AUTOMATED COUNT: 12 % (ref 11.6–15.1)
EST. AVERAGE GLUCOSE BLD GHB EST-MCNC: 105 MG/DL
GFR SERPL CREATININE-BSD FRML MDRD: 104 ML/MIN/1.73SQ M
GLUCOSE P FAST SERPL-MCNC: 74 MG/DL (ref 65–99)
GLUCOSE UR STRIP-MCNC: NEGATIVE MG/DL
HBA1C MFR BLD: 5.3 %
HCT VFR BLD AUTO: 44.5 % (ref 34.8–46.1)
HCV AB SER QL: NORMAL
HDLC SERPL-MCNC: 73 MG/DL
HGB BLD-MCNC: 15.2 G/DL (ref 11.5–15.4)
HGB UR QL STRIP.AUTO: ABNORMAL
KETONES UR STRIP-MCNC: NEGATIVE MG/DL
LDLC SERPL CALC-MCNC: 130 MG/DL (ref 0–100)
LEUKOCYTE ESTERASE UR QL STRIP: ABNORMAL
MCH RBC QN AUTO: 33 PG (ref 26.8–34.3)
MCHC RBC AUTO-ENTMCNC: 34.2 G/DL (ref 31.4–37.4)
MCV RBC AUTO: 97 FL (ref 82–98)
MUCOUS THREADS UR QL AUTO: ABNORMAL
NITRITE UR QL STRIP: POSITIVE
NON-SQ EPI CELLS URNS QL MICRO: ABNORMAL /HPF
NONHDLC SERPL-MCNC: 145 MG/DL
OTHER STN SPEC: ABNORMAL
PH UR STRIP.AUTO: 6 [PH]
PLATELET # BLD AUTO: 282 THOUSANDS/UL (ref 149–390)
PMV BLD AUTO: 10.5 FL (ref 8.9–12.7)
POTASSIUM SERPL-SCNC: 4.2 MMOL/L (ref 3.5–5.3)
PROT SERPL-MCNC: 7.4 G/DL (ref 6.4–8.2)
PROT UR STRIP-MCNC: NEGATIVE MG/DL
RBC # BLD AUTO: 4.6 MILLION/UL (ref 3.81–5.12)
RBC #/AREA URNS AUTO: ABNORMAL /HPF
SODIUM SERPL-SCNC: 133 MMOL/L (ref 136–145)
SP GR UR STRIP.AUTO: 1.01 (ref 1–1.03)
TRIGL SERPL-MCNC: 74 MG/DL
TSH SERPL DL<=0.05 MIU/L-ACNC: 2.11 UIU/ML (ref 0.36–3.74)
UROBILINOGEN UR QL STRIP.AUTO: 0.2 E.U./DL
WBC # BLD AUTO: 5.16 THOUSAND/UL (ref 4.31–10.16)
WBC #/AREA URNS AUTO: ABNORMAL /HPF

## 2020-07-16 PROCEDURE — 36415 COLL VENOUS BLD VENIPUNCTURE: CPT

## 2020-07-16 PROCEDURE — 80061 LIPID PANEL: CPT

## 2020-07-16 PROCEDURE — 85027 COMPLETE CBC AUTOMATED: CPT

## 2020-07-16 PROCEDURE — 86803 HEPATITIS C AB TEST: CPT

## 2020-07-16 PROCEDURE — 84443 ASSAY THYROID STIM HORMONE: CPT

## 2020-07-16 PROCEDURE — 80053 COMPREHEN METABOLIC PANEL: CPT

## 2020-07-16 PROCEDURE — 81001 URINALYSIS AUTO W/SCOPE: CPT | Performed by: INTERNAL MEDICINE

## 2020-07-16 PROCEDURE — 82306 VITAMIN D 25 HYDROXY: CPT

## 2020-07-16 PROCEDURE — 87389 HIV-1 AG W/HIV-1&-2 AB AG IA: CPT

## 2020-07-16 PROCEDURE — 83036 HEMOGLOBIN GLYCOSYLATED A1C: CPT

## 2020-07-17 DIAGNOSIS — E55.9 VITAMIN D DEFICIENCY: Primary | ICD-10-CM

## 2020-07-17 LAB — HIV 1+2 AB+HIV1 P24 AG SERPL QL IA: NORMAL

## 2020-07-17 RX ORDER — ERGOCALCIFEROL 1.25 MG/1
50000 CAPSULE ORAL WEEKLY
Qty: 12 CAPSULE | Refills: 0 | Status: SHIPPED | OUTPATIENT
Start: 2020-07-17 | End: 2020-08-05 | Stop reason: SDUPTHER

## 2020-07-17 NOTE — RESULT ENCOUNTER NOTE
Please advise pt that labs show no diabetes or pre-diabetes, cholesterol slightly elevated, Hep C virus negative, normal thyroid and kidney function  Liver enzymes were slightly elevated, recommend limiting alcohol to less than 2 beers daily  Also, vitamin D was very low so I've ordered high-dose weekly supplementation for 3 months, at which time we will repeat blood work to check levels   Thx

## 2020-07-20 DIAGNOSIS — N30.01 ACUTE CYSTITIS WITH HEMATURIA: Primary | ICD-10-CM

## 2020-07-20 RX ORDER — NITROFURANTOIN 25; 75 MG/1; MG/1
100 CAPSULE ORAL 2 TIMES DAILY
Qty: 10 CAPSULE | Refills: 0 | Status: SHIPPED | OUTPATIENT
Start: 2020-07-20 | End: 2020-07-25

## 2020-08-05 ENCOUNTER — OFFICE VISIT (OUTPATIENT)
Dept: FAMILY MEDICINE CLINIC | Facility: CLINIC | Age: 55
End: 2020-08-05
Payer: COMMERCIAL

## 2020-08-05 VITALS
BODY MASS INDEX: 22.96 KG/M2 | SYSTOLIC BLOOD PRESSURE: 138 MMHG | RESPIRATION RATE: 18 BRPM | OXYGEN SATURATION: 99 % | HEIGHT: 69 IN | TEMPERATURE: 98.7 F | HEART RATE: 88 BPM | DIASTOLIC BLOOD PRESSURE: 84 MMHG | WEIGHT: 155 LBS

## 2020-08-05 DIAGNOSIS — Z12.4 PAP SMEAR FOR CERVICAL CANCER SCREENING: ICD-10-CM

## 2020-08-05 DIAGNOSIS — E55.9 VITAMIN D DEFICIENCY: ICD-10-CM

## 2020-08-05 DIAGNOSIS — N30.01 ACUTE CYSTITIS WITH HEMATURIA: ICD-10-CM

## 2020-08-05 DIAGNOSIS — R19.5 POSITIVE COLORECTAL CANCER SCREENING USING COLOGUARD TEST: ICD-10-CM

## 2020-08-05 DIAGNOSIS — Z72.0 TOBACCO ABUSE: ICD-10-CM

## 2020-08-05 DIAGNOSIS — R74.8 ELEVATED LIVER ENZYMES: ICD-10-CM

## 2020-08-05 DIAGNOSIS — I10 ESSENTIAL HYPERTENSION: Primary | ICD-10-CM

## 2020-08-05 PROCEDURE — 3008F BODY MASS INDEX DOCD: CPT | Performed by: INTERNAL MEDICINE

## 2020-08-05 PROCEDURE — 88141 CYTOPATH C/V INTERPRET: CPT | Performed by: PATHOLOGY

## 2020-08-05 PROCEDURE — 4004F PT TOBACCO SCREEN RCVD TLK: CPT | Performed by: INTERNAL MEDICINE

## 2020-08-05 PROCEDURE — 3725F SCREEN DEPRESSION PERFORMED: CPT | Performed by: INTERNAL MEDICINE

## 2020-08-05 PROCEDURE — 3075F SYST BP GE 130 - 139MM HG: CPT | Performed by: INTERNAL MEDICINE

## 2020-08-05 PROCEDURE — 3079F DIAST BP 80-89 MM HG: CPT | Performed by: INTERNAL MEDICINE

## 2020-08-05 PROCEDURE — 99214 OFFICE O/P EST MOD 30 MIN: CPT | Performed by: INTERNAL MEDICINE

## 2020-08-05 PROCEDURE — 88175 CYTOPATH C/V AUTO FLUID REDO: CPT | Performed by: PATHOLOGY

## 2020-08-05 PROCEDURE — 87624 HPV HI-RISK TYP POOLED RSLT: CPT | Performed by: INTERNAL MEDICINE

## 2020-08-05 RX ORDER — ERGOCALCIFEROL 1.25 MG/1
50000 CAPSULE ORAL WEEKLY
Qty: 4 CAPSULE | Refills: 2 | Status: SHIPPED | OUTPATIENT
Start: 2020-08-05 | End: 2021-06-11

## 2020-08-05 RX ORDER — LISINOPRIL 20 MG/1
20 TABLET ORAL DAILY
Qty: 90 TABLET | Refills: 1 | Status: SHIPPED | OUTPATIENT
Start: 2020-08-05 | End: 2021-06-29 | Stop reason: SDUPTHER

## 2020-08-05 NOTE — PROGRESS NOTES
[facility]        NAME: Carole Councilman is a 47 y o  female  : 1965    MRN: [id number]  DATE: 2020  TIME: 5:20 PM    Assessment and Plan   1  Essential hypertension  - Bp improved, slightly elevated today, continue lisinopril, monitor at follow up      - lisinopril (ZESTRIL) 20 mg tablet; Take 1 tablet (20 mg total) by mouth daily    2  Positive colorectal cancer screening using Cologuard test  -  Discussed findings and implications, as well as need for diagnostic colonoscopy  No family hx of polyps, colon cancer or other solid tumor malignancy  Pt is asymptomatic  Discussed elevated liver enzymes (AST 49, ALT 61), negative HCV  Counseled about limiting alcohol intake to < 2-3 standard drinks per day, will repeat CMP/hepatic panel at follow up  - Ambulatory referral to Gastroenterology; Future    4  Pap smear for cervical cancer screening  -  PAP-HPV obtained today, last pap results unknown  Recent mammogram normal    -   Liquid-based pap, diagnostic    5  Acute cystitis with hematuria  - pt reports being asymptomatic, repeat u/a at this time  If hematuria persists will obtain imaging and consider urology referral for cystoscopy, as pt is smoker   -    UA w Reflex to Microscopic w Reflex to Culture -Lab Collect    6  Vitamin D deficiency  -   DEXA scan showing osteopenia, discussed importance of high-dose repletion followed by maintenance dosing    - ergocalciferol (VITAMIN D2) 50,000 units; Take 1 capsule (50,000 Units total) by mouth once a week for 12 doses             Chief Complaint     Chief Complaint   Patient presents with    Gynecologic Exam         History of Present Illness       Here for follow up and pap smear  Positive cologuard: no family hx of colon, breast or prostate cancer  No abdominal pain, constipation, diarrhea or blood in stool  Last pap smear over 20 years ago  LMP 7 years ago  No vaginal discharge or bleeding        Vitamin D deficiency: taking weekly high-dose supplementation, needs refill  UTI: finished Macrobid, denies dysuria, hematuria, urgency or frequency  Review of Systems   Review of Systems   Constitutional: Negative for appetite change, chills, fatigue and fever  Respiratory: Negative for cough, shortness of breath and wheezing  Cardiovascular: Negative for chest pain  Gastrointestinal: Negative for abdominal pain, blood in stool, constipation, diarrhea, nausea, rectal pain and vomiting  Genitourinary: Negative for decreased urine volume, difficulty urinating, dysuria, frequency, hematuria, menstrual problem, vaginal bleeding and vaginal discharge           Current Medications       Current Outpatient Medications:     ergocalciferol (VITAMIN D2) 50,000 units, Take 1 capsule (50,000 Units total) by mouth once a week for 12 doses, Disp: 4 capsule, Rfl: 2    lisinopril (ZESTRIL) 20 mg tablet, Take 1 tablet (20 mg total) by mouth daily, Disp: 90 tablet, Rfl: 1    Current Allergies     Allergies as of 08/05/2020    (No Known Allergies)            The following portions of the patient's history were reviewed and updated as appropriate: allergies, current medications, past family history, past medical history, past social history, past surgical history and problem list      Past Medical History:   Diagnosis Date    Anxiety     Hypertension        Past Surgical History:   Procedure Laterality Date    BREAST CYST EXCISION Right 30yrs     benign    EYE SURGERY      TUBAL LIGATION         Family History   Problem Relation Age of Onset    Heart disease Mother     Hypertension Mother     Cancer Mother     Heart disease Father     Hypertension Father     Aneurysm Father     No Known Problems Sister     No Known Problems Daughter     No Known Problems Maternal Grandmother     No Known Problems Paternal Grandmother     No Known Problems Sister     No Known Problems Sister     No Known Problems Daughter     No Known Problems Maternal Aunt     No Known Problems Maternal Aunt     No Known Problems Paternal Aunt          Medications have been verified  Objective   /84   Pulse 88   Temp 98 7 °F (37 1 °C) (Temporal)   Resp 18   Ht 5' 9" (1 753 m)   Wt 70 3 kg (155 lb)   SpO2 99%   BMI 22 89 kg/m²        Physical Exam     Physical Exam   Constitutional: No distress  Genitourinary:    Vulva, vagina, right adnexa, left adnexa and rectum normal    Rectum:      No tenderness or external hemorrhoid  Pelvic exam was performed with patient in the lithotomy position  There is no rash or lesion on the right labia  There is no rash or lesion on the left labia  Uterus is not enlarged and not tender  Cervix exhibits no motion tenderness, no lesion, no discharge and no friability  No vaginal discharge or tenderness  No tenderness in the vagina  No lesions in the vagina  Neurological: She is alert  Results:  Lab Results   Component Value Date    SODIUM 133 (L) 07/16/2020    K 4 2 07/16/2020     07/16/2020    CO2 26 07/16/2020    BUN 4 (L) 07/16/2020    CREATININE 0 60 07/16/2020    CALCIUM 9 1 07/16/2020       Lab Results   Component Value Date    HGBA1C 5 3 07/16/2020       Lab Results   Component Value Date    WBC 5 16 07/16/2020    HGB 15 2 07/16/2020    HCT 44 5 07/16/2020    MCV 97 07/16/2020     07/16/2020     HDL 73,     HCV Ab negative, HIV Ab negative    D25-OH 9 8    AST 49, ALT 61      DEXA Scan 07/15/20  RESULTS:      LUMBAR SPINE L1-L4 : BMD  1 107  gm/cm2 / T-score -0 7 / Z score 0 1         LEFT TOTAL HIP: BMD 0 851  gm/cm2 / T-score -1 2 / Z score -0 6  LEFT FEMORAL NECK: BMD 0 835  gm/cm2 / T score -1 5 / Z score -0 5     RIGHT TOTAL HIP: BMD  0 856  gm/cm2 / T-score -1 2 / Z score -0 6  RIGHT FEMORAL NECK: BMD  0 833  gm/cm2 / T score -1 5 / Z score -0 5        IMPRESSION:  1  Low bone mass (osteopenia)      2    The 10 year risk of hip fracture is 2 6% with the 10 year risk of major osteoporotic fracture being 13 8% as calculated by the [address] [place] [address]/WHO fracture risk assessment tool (FRAX)     3  The current NOF guidelines recommend treating patients with a T-score of -2 5 or less in the lumbar spine or hips, or in post-menopausal women and men over the age of 48 with low bone mass (osteopenia) and a FRAX 10 year risk score of >3% for hip   fracture and/or >20% for major osteoporotic fracture      4  The NOF recommends follow-up DXA in 1-2 years after initiating therapy for osteoporosis and every 2 years thereafter  More frequent evaluation is appropriate for patients with conditions associated with rapid bone loss, such as glucocorticoid   therapy  The interval between DXA screenings may be longer for individuals without major risk factors and initial T-score in the normal or upper low bone mass range  Mammogram 07/15/20  FINDINGS:   There are no suspicious masses, grouped microcalcifications or areas of architectural distortion  The skin and nipple areolar complex are unremarkable      IMPRESSION:  No mammographic evidence of malignancy            ASSESSMENT/BI-RADS CATEGORY:  Left: 1 - Negative  Right: 1 - Negative  Overall: 1 - Negative     RECOMMENDATION:       - Routine screening mammogram in 1 year for both breasts

## 2020-08-07 LAB
HPV HR 12 DNA CVX QL NAA+PROBE: POSITIVE
HPV16 DNA CVX QL NAA+PROBE: NEGATIVE
HPV18 DNA CVX QL NAA+PROBE: NEGATIVE

## 2020-08-13 DIAGNOSIS — R87.618 PAP SMEAR ABNORMALITY OF CERVIX/HUMAN PAPILLOMAVIRUS (HPV) POSITIVE: ICD-10-CM

## 2020-08-13 DIAGNOSIS — R87.613 HSIL (HIGH GRADE SQUAMOUS INTRAEPITHELIAL LESION) ON PAP SMEAR OF CERVIX: Primary | ICD-10-CM

## 2020-08-13 LAB
LAB AP GYN PRIMARY INTERPRETATION: ABNORMAL
Lab: ABNORMAL
PATH INTERP SPEC-IMP: ABNORMAL

## 2020-08-13 NOTE — RESULT ENCOUNTER NOTE
Please advise pt that pap smear showed some abnormal cells are present  I'd like her to see Gyn for further evaluation, possible colposcopy  Referral placed   Thx

## 2020-08-24 ENCOUNTER — TELEPHONE (OUTPATIENT)
Dept: FAMILY MEDICINE CLINIC | Facility: CLINIC | Age: 55
End: 2020-08-24

## 2020-08-24 NOTE — TELEPHONE ENCOUNTER
Pt called and she received her letter    Informed patient of the message from Dr Zaynab Mendez    Will send her referral    Please advise    Phone: [phone]

## 2020-09-03 ENCOUNTER — OFFICE VISIT (OUTPATIENT)
Dept: GASTROENTEROLOGY | Facility: CLINIC | Age: 55
End: 2020-09-03
Payer: COMMERCIAL

## 2020-09-03 VITALS
TEMPERATURE: 97.5 F | OXYGEN SATURATION: 98 % | WEIGHT: 156 LBS | BODY MASS INDEX: 23.11 KG/M2 | HEIGHT: 69 IN | HEART RATE: 90 BPM | RESPIRATION RATE: 18 BRPM | DIASTOLIC BLOOD PRESSURE: 88 MMHG | SYSTOLIC BLOOD PRESSURE: 152 MMHG

## 2020-09-03 DIAGNOSIS — R74.8 ELEVATED LIVER ENZYMES: ICD-10-CM

## 2020-09-03 DIAGNOSIS — R19.5 POSITIVE COLORECTAL CANCER SCREENING USING COLOGUARD TEST: Primary | ICD-10-CM

## 2020-09-03 PROCEDURE — 99244 OFF/OP CNSLTJ NEW/EST MOD 40: CPT | Performed by: INTERNAL MEDICINE

## 2020-09-03 PROCEDURE — 4004F PT TOBACCO SCREEN RCVD TLK: CPT | Performed by: INTERNAL MEDICINE

## 2020-09-03 PROCEDURE — 3079F DIAST BP 80-89 MM HG: CPT | Performed by: INTERNAL MEDICINE

## 2020-09-03 NOTE — ASSESSMENT & PLAN NOTE
Patient was noted to have a positive colo guard  She will be scheduled for colonoscopy  She will try a Colyte preparation in split dose with the 2nd dose completed 3 hours prior to arrival   Two bisacodyl tablets  I explained to the patient the procedure of colonoscopy as well as its potential risks which are approximately 3 in 1000 chance of bleeding, infection, and perforation  Perforation may require a surgeon to repair it  I also explained the small but significant chance of missing lesions with colonoscopy which has been reported to be about 5-10%

## 2020-09-03 NOTE — PROGRESS NOTES
[facility]  Gastroenterology Outpatient Consultation  Patient Yaneli Thomas   Age 47 y o  Gender female   MRN: [id number]  [facility] [id number]     ASSESSMENT AND PLAN:   Problem List Items Addressed This Visit        Other    Positive colorectal cancer screening using Cologuard test - Primary     Patient was noted to have a positive colo guard  She will be scheduled for colonoscopy  She will try a Colyte preparation in split dose with the 2nd dose completed 3 hours prior to arrival   Two bisacodyl tablets  I explained to the patient the procedure of colonoscopy as well as its potential risks which are approximately 3 in 1000 chance of bleeding, infection, and perforation  Perforation may require a surgeon to repair it  I also explained the small but significant chance of missing lesions with colonoscopy which has been reported to be about 5-10%  Elevated liver enzymes     Patient was noted to have mildly abnormal liver enzymes  AST is mildly elevated  Hepatitis C was negative  Repeat liver enzymes  I did advise her to decrease her alcohol intake, she does have about 2 beers per day  Decreased Advil use  Check hepatitis A and hepatitis-B  Check iron studies  Check celiac antibodies specially given vitamin-D deficiency  Check ultrasound abdomen           Relevant Orders    Hepatitis A antibody, total    Hepatitis B core antibody, total    Hepatitis B surface antibody    Hepatitis B surface antigen    Hepatic function panel    Celiac Disease Antibody Profile    US abdomen complete         _____________________________________________________________    HPI:  Carter Lancaster is a delightful 47 for a woman who I had the opportunity see in the office in consultation for a positive ColoGuard and mildly abnormal liver enzymes  She denies any change in her bowel habits  Denies any diarrhea, constipation, bleeding or black stools  There has been no abdominal pain    From an upper GI standpoint denies any heartburn, indigestion, dysphagia, nausea, vomiting, or early satiety  She does take a couple Advil per day for joint pain  She was noted to have a mild AST elevation of 49 the ALT was 61  She has never been told she had abnormal liver enzymes the past   She denies any high-risk behavior that could lead to abnormal liver enzymes  Denies any prior history of hepatitis  She does smoke 1 pack a cigarettes + per day  She may have two 12 oz beers per day  There is no family history colon cancer colon polyps  Mom had some form of brain tumor  No Known Allergies  Current Outpatient Medications   Medication Sig Dispense Refill    ergocalciferol (VITAMIN D2) 50,000 units Take 1 capsule (50,000 Units total) by mouth once a week for 12 doses 4 capsule 2    lisinopril (ZESTRIL) 20 mg tablet Take 1 tablet (20 mg total) by mouth daily 90 tablet 1     No current facility-administered medications for this visit        MEDICAL HISTORY:  Past Medical History:   Diagnosis Date    Anxiety     Hypertension      Past Surgical History:   Procedure Laterality Date    BREAST CYST EXCISION Right 30yrs     benign    EYE SURGERY      TUBAL LIGATION       Social History     Substance and Sexual Activity   Alcohol Use Yes    Comment: 3 beer/day     Social History     Substance and Sexual Activity   Drug Use No     Social History     Tobacco Use   Smoking Status Current Every Day Smoker    Packs/day: 2 00   Smokeless Tobacco Never Used     Family History   Problem Relation Age of Onset    Heart disease Mother     Hypertension Mother     Cancer Mother     Heart disease Father     Hypertension Father     Aneurysm Father     No Known Problems Sister     No Known Problems Daughter     No Known Problems Maternal Grandmother     No Known Problems Paternal Grandmother     No Known Problems Sister     No Known Problems Sister     No Known Problems Daughter     No Known Problems Maternal Aunt     No Known Problems Maternal Aunt     No Known Problems Paternal Aunt        REVIEW OF SYSTEMS:  CONSTITUTIONAL: Denies any fever, chills, rigors, and weight loss  HEENT: No earache or tinnitus  Denies hearing loss or visual disturbances  CARDIOVASCULAR: No chest pain or palpitations  RESPIRATORY: Denies any cough, hemoptysis, shortness of breath or dyspnea on exertion  GASTROINTESTINAL: As noted in the History of Present Illness  GENITOURINARY: No problems with urination  Denies any hematuria or dysuria  NEUROLOGIC: No dizziness or vertigo, denies headaches  MUSCULOSKELETAL: Denies any muscle   She does report joint pain  SKIN: Denies skin rashes or itching  ENDOCRINE: Denies excessive thirst  Denies intolerance to heat or cold  PSYCHOSOCIAL: Denies depression or anxiety  Denies any recent memory loss  Objective   Blood pressure 152/88, pulse 90, temperature 97 5 °F (36 4 °C), resp  rate 18, height 5' 9" (1 753 m), weight 70 8 kg (156 lb), SpO2 98 %  Body mass index is 23 04 kg/m²  PHYSICAL EXAM:   General Appearance: Alert, cooperative, no distress  HEENT: Normocephalic, atraumatic, anicteric  Neck: Supple, symmetrical, trachea midline  Lungs: Clear to auscultation bilaterally; no rales, rhonchi or wheezing; respirations unlabored   Heart: Regular rate and rhythm; no murmur, rub, or gallop  Abdomen: Soft, bowel sounds normal, non-tender, non-distended; no masses, there is no hepatosplenomegaly   No spider angiomas  Genitalia: Deferred   Rectal: Deferred   Extremities: No cyanosis, clubbing or edema   Skin: No jaundice, rashes, or lesions Several tattos noted  Lymph nodes: No palpable cervical lymphadenopathy   Lab Results:   Office Visit on 08/05/2020   Component Date Value    Case Report 08/05/2020                      Value:Gynecologic Cytology Report                       Case: [id number]                                  Authorizing Provider:  Kay Weiner MD          Collected: 08/05/2020 1720              Ordering Location:     [facility]        Received:            08/05/2020 1720                                     Primary Care                                                                 First Screen:          Anthony Hernandez                                                                  Pathologist:           Kahlil Gross MD                                                        Specimen:    LIQUID-BASED PAP, DIAGNOSTIC, Cervix                                                       Primary Interpretation 08/05/2020 Epithelial cell abnormality     Interpretation 08/05/2020 High grade squamous intraepithelial lesion     Specimen Adequacy 08/05/2020 Satisfactory for evaluation  Endocervical/transformation zone component present   Additional Information 08/05/2020                      Value: This result contains rich text formatting which cannot be displayed here      Clinical Information 08/05/2020                      Value:Post-menopausal, last pap > 10 years ago ( results unknown)    HPV Other HR 08/05/2020 Positive*    HPV16 08/05/2020 Negative     HPV18 08/05/2020 Negative    Lab on 07/16/2020   Component Date Value    Cholesterol 07/16/2020 218*    Triglycerides 07/16/2020 74     HDL, Direct 07/16/2020 73     LDL Calculated 07/16/2020 130*    Non-HDL-Chol (CHOL-HDL) 07/16/2020 145     HIV-1/HIV-2 Ab 07/16/2020 Non-Reactive     Hepatitis C Ab 07/16/2020 Non-reactive     Hemoglobin A1C 07/16/2020 5 3     EAG 07/16/2020 105     WBC 07/16/2020 5 16     RBC 07/16/2020 4 60     Hemoglobin 07/16/2020 15 2     Hematocrit 07/16/2020 44 5     MCV 07/16/2020 97     MCH 07/16/2020 33 0     MCHC 07/16/2020 34 2     RDW 07/16/2020 12 0     Platelets 19/06/9802 282     MPV 07/16/2020 10 5     Sodium 07/16/2020 133*    Potassium 07/16/2020 4 2     Chloride 07/16/2020 102     CO2 07/16/2020 26     ANION GAP 07/16/2020 5     BUN 07/16/2020 4*    Creatinine 07/16/2020 0 60     Glucose, Fasting 07/16/2020 74     Calcium 07/16/2020 9 1     AST 07/16/2020 49*    ALT 07/16/2020 61     Alkaline Phosphatase 07/16/2020 88     Total Protein 07/16/2020 7 4     Albumin 07/16/2020 3 7     Total Bilirubin 07/16/2020 0 33     eGFR 07/16/2020 104     TSH 3RD GENERATON 07/16/2020 2  110     Vit D, 25-Hydroxy 07/16/2020 9 8*     Radiology Results:   No results found    One Suzy Louis DO   09/03/20   Cc:

## 2020-09-03 NOTE — PATIENT INSTRUCTIONS
Positive colorectal cancer screening using Cologuard test  Patient was noted to have a positive colo guard  She will be scheduled for colonoscopy  She will try a Colyte preparation in split dose with the 2nd dose completed 3 hours prior to arrival   Two bisacodyl tablets  I explained to the patient the procedure of colonoscopy as well as its potential risks which are approximately 3 in 1000 chance of bleeding, infection, and perforation  Perforation may require a surgeon to repair it  I also explained the small but significant chance of missing lesions with colonoscopy which has been reported to be about 5-10%  Elevated liver enzymes  Patient was noted to have mildly abnormal liver enzymes  AST is mildly elevated  Hepatitis C was negative  Repeat liver enzymes  I did advise her to decrease her alcohol intake, she does have about 2 beers per day  Decreased Advil use  Check hepatitis A and hepatitis-B  Check iron studies  Check celiac antibodies specially given vitamin-D deficiency  Check ultrasound abdomen      Pt is scheduled for a colon with dr Margy Soriano at [facility] on 11/13/20, I went over golytely in office with pt and she has blue folder with instructions/labs  Patient is aware she will need a  to and from   She will get a call the day before with an exact time for arrival   Pt is scheduled for an US on 9/10/20 at [facility]

## 2020-09-03 NOTE — ASSESSMENT & PLAN NOTE
Patient was noted to have mildly abnormal liver enzymes  AST is mildly elevated  Hepatitis C was negative  Repeat liver enzymes  I did advise her to decrease her alcohol intake, she does have about 2 beers per day  Decreased Advil use  Check hepatitis A and hepatitis-B    Check iron studies  Check celiac antibodies specially given vitamin-D deficiency  Check ultrasound abdomen

## 2020-09-10 ENCOUNTER — LAB (OUTPATIENT)
Dept: LAB | Facility: CLINIC | Age: 55
End: 2020-09-10
Payer: COMMERCIAL

## 2020-09-10 ENCOUNTER — HOSPITAL ENCOUNTER (OUTPATIENT)
Dept: ULTRASOUND IMAGING | Facility: HOSPITAL | Age: 55
Discharge: HOME | End: 2020-09-10
Attending: INTERNAL MEDICINE
Payer: COMMERCIAL

## 2020-09-10 DIAGNOSIS — R74.8 ELEVATED LIVER ENZYMES: ICD-10-CM

## 2020-09-10 LAB
ALBUMIN SERPL BCP-MCNC: 4.1 G/DL (ref 3.5–5)
ALP SERPL-CCNC: 88 U/L (ref 46–116)
ALT SERPL W P-5'-P-CCNC: 44 U/L (ref 12–78)
AMORPH URATE CRY URNS QL MICRO: ABNORMAL /HPF
AST SERPL W P-5'-P-CCNC: 35 U/L (ref 5–45)
BACTERIA UR QL AUTO: ABNORMAL /HPF
BILIRUB DIRECT SERPL-MCNC: 0.14 MG/DL (ref 0–0.2)
BILIRUB SERPL-MCNC: 0.49 MG/DL (ref 0.2–1)
BILIRUB UR QL STRIP: NEGATIVE
CLARITY UR: ABNORMAL
COLOR UR: YELLOW
GLUCOSE UR STRIP-MCNC: NEGATIVE MG/DL
HGB UR QL STRIP.AUTO: ABNORMAL
KETONES UR STRIP-MCNC: NEGATIVE MG/DL
LEUKOCYTE ESTERASE UR QL STRIP: ABNORMAL
NITRITE UR QL STRIP: POSITIVE
NON-SQ EPI CELLS URNS QL MICRO: ABNORMAL /HPF
PH UR STRIP.AUTO: 6 [PH]
PROT SERPL-MCNC: 7.7 G/DL (ref 6.4–8.2)
PROT UR STRIP-MCNC: ABNORMAL MG/DL
RBC #/AREA URNS AUTO: ABNORMAL /HPF
SP GR UR STRIP.AUTO: 1.01 (ref 1–1.03)
UROBILINOGEN UR QL STRIP.AUTO: 0.2 E.U./DL
WBC #/AREA URNS AUTO: ABNORMAL /HPF

## 2020-09-10 PROCEDURE — 87086 URINE CULTURE/COLONY COUNT: CPT | Performed by: INTERNAL MEDICINE

## 2020-09-10 PROCEDURE — 86706 HEP B SURFACE ANTIBODY: CPT

## 2020-09-10 PROCEDURE — 87340 HEPATITIS B SURFACE AG IA: CPT

## 2020-09-10 PROCEDURE — 82784 ASSAY IGA/IGD/IGG/IGM EACH: CPT

## 2020-09-10 PROCEDURE — 87186 SC STD MICRODIL/AGAR DIL: CPT | Performed by: INTERNAL MEDICINE

## 2020-09-10 PROCEDURE — 86255 FLUORESCENT ANTIBODY SCREEN: CPT

## 2020-09-10 PROCEDURE — 86704 HEP B CORE ANTIBODY TOTAL: CPT

## 2020-09-10 PROCEDURE — 83516 IMMUNOASSAY NONANTIBODY: CPT

## 2020-09-10 PROCEDURE — 81001 URINALYSIS AUTO W/SCOPE: CPT | Performed by: INTERNAL MEDICINE

## 2020-09-10 PROCEDURE — 86708 HEPATITIS A ANTIBODY: CPT

## 2020-09-10 PROCEDURE — 36415 COLL VENOUS BLD VENIPUNCTURE: CPT

## 2020-09-10 PROCEDURE — 80076 HEPATIC FUNCTION PANEL: CPT

## 2020-09-10 PROCEDURE — 87077 CULTURE AEROBIC IDENTIFY: CPT | Performed by: INTERNAL MEDICINE

## 2020-09-10 PROCEDURE — 76700 US EXAM ABDOM COMPLETE: CPT

## 2020-09-10 NOTE — RESULT ENCOUNTER NOTE
Please inform patient that ultrasound of the abdomen revealed mild fatty infiltration, this can occur from alcohol  As discussed in the office suggest decreasing alcohol intake  Spleen appeared normal   There was a small 3 mm gallbladder polyp, these are benign  Would recommend repeating ultrasound in 1 year  Please recall for repeat ultrasound in 1 year    Thank you

## 2020-09-11 LAB
ENDOMYSIUM IGA SER QL: NEGATIVE
GLIADIN PEPTIDE IGA SER-ACNC: 4 UNITS (ref 0–19)
GLIADIN PEPTIDE IGG SER-ACNC: 4 UNITS (ref 0–19)
HAV AB SER QL IA: NORMAL
HBV CORE AB SER QL: NORMAL
HBV SURFACE AB SER-ACNC: <3.1 MIU/ML
HBV SURFACE AG SER QL: NORMAL
IGA SERPL-MCNC: 260 MG/DL (ref 87–352)
TTG IGA SER-ACNC: <2 U/ML (ref 0–3)
TTG IGG SER-ACNC: <2 U/ML (ref 0–5)

## 2020-09-12 LAB — BACTERIA UR CULT: ABNORMAL

## 2020-09-14 DIAGNOSIS — N30.01 ACUTE CYSTITIS WITH HEMATURIA: Primary | ICD-10-CM

## 2020-09-14 RX ORDER — SULFAMETHOXAZOLE AND TRIMETHOPRIM 800; 160 MG/1; MG/1
1 TABLET ORAL 2 TIMES DAILY
Qty: 6 TABLET | Refills: 0 | Status: SHIPPED | OUTPATIENT
Start: 2020-09-14 | End: 2020-09-17

## 2020-09-14 NOTE — RESULT ENCOUNTER NOTE
Please inform patient that her liver enzymes are now normal   Celiac blood test are negative  She is not immune to hepatitis-B and therefore should consider hepatitis-B vaccination  If she is also able to get hepatitis a vaccine, she can do that as well    Thank you

## 2020-09-14 NOTE — RESULT ENCOUNTER NOTE
Please advise pt that urine culture shows infection and that antibiotic last time may not have been effective  Would like her to take Bactrim DS twice daily for 3 days  Rx sent to pharmacy   x

## 2020-11-12 DIAGNOSIS — Z11.59 SCREENING FOR VIRAL DISEASE: Primary | ICD-10-CM

## 2020-11-13 ENCOUNTER — ANESTHESIA EVENT (OUTPATIENT)
Dept: GASTROENTEROLOGY | Facility: HOSPITAL | Age: 55
End: 2020-11-13

## 2020-11-13 ENCOUNTER — ANESTHESIA (OUTPATIENT)
Dept: GASTROENTEROLOGY | Facility: HOSPITAL | Age: 55
End: 2020-11-13

## 2020-11-13 ENCOUNTER — HOSPITAL ENCOUNTER (OUTPATIENT)
Dept: GASTROENTEROLOGY | Facility: HOSPITAL | Age: 55
Setting detail: OUTPATIENT SURGERY
Discharge: HOME | End: 2020-11-13
Attending: INTERNAL MEDICINE
Payer: COMMERCIAL

## 2020-11-13 VITALS — HEART RATE: 68 BPM

## 2020-11-13 VITALS
HEART RATE: 68 BPM | DIASTOLIC BLOOD PRESSURE: 80 MMHG | OXYGEN SATURATION: 97 % | SYSTOLIC BLOOD PRESSURE: 158 MMHG | RESPIRATION RATE: 20 BRPM | HEIGHT: 69 IN | WEIGHT: 156 LBS | TEMPERATURE: 97.2 F | BODY MASS INDEX: 23.11 KG/M2

## 2020-11-13 DIAGNOSIS — K76.0 FATTY LIVER: Primary | ICD-10-CM

## 2020-11-13 DIAGNOSIS — R19.5 POSITIVE COLORECTAL CANCER SCREENING USING COLOGUARD TEST: ICD-10-CM

## 2020-11-13 PROCEDURE — 45385 COLONOSCOPY W/LESION REMOVAL: CPT | Performed by: INTERNAL MEDICINE

## 2020-11-13 PROCEDURE — 88305 TISSUE EXAM BY PATHOLOGIST: CPT | Performed by: PATHOLOGY

## 2020-11-13 PROCEDURE — 45381 COLONOSCOPY SUBMUCOUS NJX: CPT | Performed by: INTERNAL MEDICINE

## 2020-11-13 RX ORDER — LIDOCAINE HYDROCHLORIDE 20 MG/ML
INJECTION, SOLUTION EPIDURAL; INFILTRATION; INTRACAUDAL; PERINEURAL AS NEEDED
Status: DISCONTINUED | OUTPATIENT
Start: 2020-11-13 | End: 2020-11-13

## 2020-11-13 RX ORDER — PROPOFOL 10 MG/ML
INJECTION, EMULSION INTRAVENOUS AS NEEDED
Status: DISCONTINUED | OUTPATIENT
Start: 2020-11-13 | End: 2020-11-13

## 2020-11-13 RX ORDER — SODIUM CHLORIDE, SODIUM LACTATE, POTASSIUM CHLORIDE, CALCIUM CHLORIDE 600; 310; 30; 20 MG/100ML; MG/100ML; MG/100ML; MG/100ML
125 INJECTION, SOLUTION INTRAVENOUS CONTINUOUS
Status: DISCONTINUED | OUTPATIENT
Start: 2020-11-13 | End: 2020-11-13

## 2020-11-13 RX ORDER — PROPOFOL 10 MG/ML
INJECTION, EMULSION INTRAVENOUS CONTINUOUS PRN
Status: DISCONTINUED | OUTPATIENT
Start: 2020-11-13 | End: 2020-11-13

## 2020-11-13 RX ADMIN — PROPOFOL 100 MG: 10 INJECTION, EMULSION INTRAVENOUS at 14:23

## 2020-11-13 RX ADMIN — PROPOFOL 140 MCG/KG/MIN: 10 INJECTION, EMULSION INTRAVENOUS at 14:23

## 2020-11-13 RX ADMIN — PROPOFOL 50 MG: 10 INJECTION, EMULSION INTRAVENOUS at 14:28

## 2020-11-13 RX ADMIN — PROPOFOL 50 MG: 10 INJECTION, EMULSION INTRAVENOUS at 14:41

## 2020-11-13 RX ADMIN — LIDOCAINE HYDROCHLORIDE 50 MG: 20 INJECTION, SOLUTION EPIDURAL; INFILTRATION; INTRACAUDAL; PERINEURAL at 14:23

## 2020-11-13 RX ADMIN — SODIUM CHLORIDE, SODIUM LACTATE, POTASSIUM CHLORIDE, AND CALCIUM CHLORIDE 125 ML/HR: .6; .31; .03; .02 INJECTION, SOLUTION INTRAVENOUS at 13:08

## 2021-02-15 ENCOUNTER — TELEPHONE (OUTPATIENT)
Dept: FAMILY MEDICINE CLINIC | Facility: CLINIC | Age: 56
End: 2021-02-15

## 2021-02-17 NOTE — TELEPHONE ENCOUNTER
Received return call from patient  Reports that she has been told she has had HPV in the past and doesn't feel the need to go be evaluated  I strongly advised patient to make appointment with GYN  I provided her with information for [facility] and even offered to assist in scheduling appointment but patient declined  She stated she will schedule "when she feels like it"  Forwarding to provider as Juventino Joel

## 2021-03-25 ENCOUNTER — OFFICE VISIT (OUTPATIENT)
Dept: GASTROENTEROLOGY | Facility: CLINIC | Age: 56
End: 2021-03-25
Payer: COMMERCIAL

## 2021-03-25 VITALS
WEIGHT: 164 LBS | HEIGHT: 69 IN | BODY MASS INDEX: 24.29 KG/M2 | RESPIRATION RATE: 18 BRPM | SYSTOLIC BLOOD PRESSURE: 130 MMHG | TEMPERATURE: 98.5 F | HEART RATE: 93 BPM | DIASTOLIC BLOOD PRESSURE: 80 MMHG

## 2021-03-25 DIAGNOSIS — Z86.010 HISTORY OF COLON POLYPS: Primary | ICD-10-CM

## 2021-03-25 DIAGNOSIS — R74.8 ELEVATED LIVER ENZYMES: ICD-10-CM

## 2021-03-25 DIAGNOSIS — E55.9 VITAMIN D DEFICIENCY: ICD-10-CM

## 2021-03-25 PROBLEM — K63.5 COLON POLYPS: Status: ACTIVE | Noted: 2021-03-25

## 2021-03-25 PROBLEM — Z86.0100 HISTORY OF COLON POLYPS: Status: ACTIVE | Noted: 2021-03-25

## 2021-03-25 PROBLEM — K63.5 COLON POLYPS: Status: RESOLVED | Noted: 2021-03-25 | Resolved: 2021-03-25

## 2021-03-25 PROCEDURE — 99214 OFFICE O/P EST MOD 30 MIN: CPT | Performed by: INTERNAL MEDICINE

## 2021-03-25 RX ORDER — POLYETHYLENE GLYCOL 3350, SODIUM CHLORIDE, SODIUM BICARBONATE, POTASSIUM CHLORIDE 420; 11.2; 5.72; 1.48 G/4L; G/4L; G/4L; G/4L
4000 POWDER, FOR SOLUTION ORAL ONCE
Qty: 4000 ML | Refills: 0 | Status: SHIPPED | OUTPATIENT
Start: 2021-03-25 | End: 2021-06-11 | Stop reason: HOSPADM

## 2021-03-25 NOTE — ASSESSMENT & PLAN NOTE
Vi Aguilera was noted to have several colon polyps (6) which were all tubular adenomas  There is a 14 mm irregular sessile polyp in the proximal transverse colon that was removed by piecemeal technique with hot snare  This area was tattoo 2 cm distal to the lesion  There are 2 polyps measuring 3-8 mm in the proximal transverse colon removed by cold snare polypectomy  There is a 9 mm sessile polyp in the mid ascending colon removed by cold snare polypectomy  There was a 1 cm pedunculated polyp at 30 cm removed by hot snare  All polyps were tubular adenomas  Given piecemeal polypectomy, it is recommended she undergo repeat colonoscopy about 6 months after her last procedure  She will be scheduled for colonoscopy in near future  She will receive a MiraLax preparation in split dose with the 2nd dose completed 3 hours prior to arrival   Four bisacodyl tablets  I explained to the patient the procedure of colonoscopy as well as its potential risks which are approximately 3 in 1000 chance of bleeding, infection, and perforation  Perforation may require a surgeon to repair it  I also explained the small but significant chance of missing lesions with colonoscopy which has been reported to be about 5-10%

## 2021-03-25 NOTE — PATIENT INSTRUCTIONS
Elevated liver enzymes  Patient was noted to have mildly abnormal liver enzymes  These actually have normalized in September  Repeat liver enzymes  She was noted to have fatty infiltration liver noted on ultrasound  I would also suggested she consider vaccination for least hepatitis B and if insurance covers it, also hepatitis a  She should try to limit her alcohol intake as much as possible  Vitamin D deficiency  Her vitamin-D level was very low back in July of 2020  She states she receive vitamin-D replacement by Dr Isis Weiss below  Check vitamin-D level  History of colon polyps  Charla Zavala was noted to have several colon polyps (6) which were all tubular adenomas  There is a 14 mm irregular sessile polyp in the proximal transverse colon that was removed by piecemeal technique with hot snare  This area was tattoo 2 cm distal to the lesion  There are 2 polyps measuring 3-8 mm in the proximal transverse colon removed by cold snare polypectomy  There is a 9 mm sessile polyp in the mid ascending colon removed by cold snare polypectomy  There was a 1 cm pedunculated polyp at 30 cm removed by hot snare  All polyps were tubular adenomas  Given piecemeal polypectomy, it is recommended she undergo repeat colonoscopy about 6 months after her last procedure  She will be scheduled for colonoscopy in near future  She will receive a MiraLax preparation in split dose with the 2nd dose completed 3 hours prior to arrival   Four bisacodyl tablets  I explained to the patient the procedure of colonoscopy as well as its potential risks which are approximately 3 in 1000 chance of bleeding, infection, and perforation  Perforation may require a surgeon to repair it  I also explained the small but significant chance of missing lesions with colonoscopy which has been reported to be about 5-10%  Pt is scheduled for a colon with dr Shravan Rodriguez at [facility] on 6/11/21, Urszula Brooks went over golytely and miralax prep with pt  golytely is preferred but if not available by June pt will take miralax prep   Patient is aware she will need a  to and from   She will get a call the day before with an exact time for arrival

## 2021-03-25 NOTE — ASSESSMENT & PLAN NOTE
Patient was noted to have mildly abnormal liver enzymes  These actually have normalized in September  Repeat liver enzymes  She was noted to have fatty infiltration liver noted on ultrasound  I would also suggested she consider vaccination for least hepatitis B and if insurance covers it, also hepatitis a  She should try to limit her alcohol intake as much as possible

## 2021-03-25 NOTE — PROGRESS NOTES
[facility] Gastroenterology  Gastroenterology Outpatient Follow up  Patient Gennancy Numbers   Age 54 y o  Gender female   MRN: [id number]  [facility] [id number]     ASSESSMENT AND PLAN:   Problem List Items Addressed This Visit        Other    Vitamin D deficiency     Her vitamin-D level was very low back in July of 2020  She states she receive vitamin-D replacement by Dr Bob Zamudio below  Check vitamin-D level  Relevant Orders    Vitamin D 25 hydroxy    Elevated liver enzymes     Patient was noted to have mildly abnormal liver enzymes  These actually have normalized in September  Repeat liver enzymes  She was noted to have fatty infiltration liver noted on ultrasound  I would also suggested she consider vaccination for least hepatitis B and if insurance covers it, also hepatitis a  She should try to limit her alcohol intake as much as possible  Relevant Orders    Hepatic function panel    History of colon polyps - Primary     Nathanael Law was noted to have several colon polyps (6) which were all tubular adenomas  There is a 14 mm irregular sessile polyp in the proximal transverse colon that was removed by piecemeal technique with hot snare  This area was tattoo 2 cm distal to the lesion  There are 2 polyps measuring 3-8 mm in the proximal transverse colon removed by cold snare polypectomy  There is a 9 mm sessile polyp in the mid ascending colon removed by cold snare polypectomy  There was a 1 cm pedunculated polyp at 30 cm removed by hot snare  All polyps were tubular adenomas  Given piecemeal polypectomy, it is recommended she undergo repeat colonoscopy about 6 months after her last procedure  She will be scheduled for colonoscopy in near future  She will receive a MiraLax preparation in split dose with the 2nd dose completed 3 hours prior to arrival   Four bisacodyl tablets    I explained to the patient the procedure of colonoscopy as well as its potential risks which are approximately 3 in 1000 chance of bleeding, infection, and perforation  Perforation may require a surgeon to repair it  I also explained the small but significant chance of missing lesions with colonoscopy which has been reported to be about 5-10%  Relevant Orders    Colonoscopy         _____________________________________________________________    HPI:  Syed Rashid is a delightful 80-year-old woman who I am seeing in the office in follow-up  She is presenting for evaluation of abnormal liver enzymes in addition to multiple colon polyps in piecemeal polypectomy  Her colonoscopy was performed on November 13, 2020  This is outlined below  Since her colonoscopy, she has not had any problems  Bowel habits are very regular, denies any diarrhea, constipation, bleeding or black stools  Her colonoscopy was performed due to a positive Cologuard  From an upper GI standpoint she states she does have a history of some reflux but as long as she avoids certain foods, she rarely if ever gets heartburn at this point time period red sauce will trigger this  There has been no dysphagia, nausea, vomiting, early satiety, or unintentional weight loss  On average she continues to drink about 2 beers per day  She smokes 1 pack of cigarettes a day  There is no family members with colon cancer colon polyps  Mom had some form of brain tumor  Records review  11/13/2020 colonoscopy  2 sessile polyps measuring 3-8 mm proximal transverse colon status post cold snare polypectomy  Tubular adenomas  6 mm sessile polyp cecum status post cold snare polypectomy tubular adenoma  9 mm sessile polyp mid ascending colon status post cold snare polypectomy  Tubular adenoma  14 mm irregular sessile polyp proximal transverse colon status piecemeal removal by electrocautery snare  This area was tattooed 2 cm distal to lesion  Tubular adenoma  1 cm pedunculated polyp at 30 cm status post electrocautery snare polypectomy    Tubular adenoma  Small external hemorrhoids    09/10/2020 ultrasound abdomen  Liver 16 5 cm  Diffuse mildly increased echogenicity  3 mm gallbladder polyp neck of the gallbladder  Common bile duct 5 mm     09/10/2020   AST 35 improved from 49   ALT 44 improved from 61   Alk-phos 88   Total protein 7 7   Albumin 4 1   Celiac antibody panel negative   Hepatitis a total antibody negative   Hepatitis B surface antigen nonreactive  Hepatitis B surface antibody nonreactive   hepatitis-B core total antibody nonreactive  Hepatitis C antibody nonreactive     07/06/2020 vitamin-D -9 8    No Known Allergies  Current Outpatient Medications   Medication Sig Dispense Refill    lisinopril (ZESTRIL) 20 mg tablet Take 1 tablet (20 mg total) by mouth daily 90 tablet 1    ergocalciferol (VITAMIN D2) 50,000 units Take 1 capsule (50,000 Units total) by mouth once a week for 12 doses 4 capsule 2     No current facility-administered medications for this visit        MEDICAL HISTORY:  Past Medical History:   Diagnosis Date    Anxiety     Colon polyp     Hypertension      Past Surgical History:   Procedure Laterality Date    BREAST CYST EXCISION Right 30yrs     benign    EYE SURGERY      TUBAL LIGATION       Social History     Substance and Sexual Activity   Alcohol Use Yes    Comment: 3 beer/day     Social History     Substance and Sexual Activity   Drug Use No     Social History     Tobacco Use   Smoking Status Current Every Day Smoker    Packs/day: 2 00    Types: Cigarettes   Smokeless Tobacco Never Used     Family History   Problem Relation Age of Onset    Heart disease Mother     Hypertension Mother     Cancer Mother     Heart disease Father     Hypertension Father     Aneurysm Father     No Known Problems Sister     No Known Problems Daughter     No Known Problems Maternal Grandmother     No Known Problems Paternal Grandmother     No Known Problems Sister     No Known Problems Sister     No Known Problems Daughter     No Known Problems Maternal Aunt     No Known Problems Maternal Aunt     No Known Problems Paternal Aunt        Objective   Blood pressure 130/80, pulse 93, temperature 98 5 °F (36 9 °C), resp  rate 18, height 5' 9" (1 753 m), weight 74 4 kg (164 lb), not currently breastfeeding  Body mass index is 24 22 kg/m²  PHYSICAL EXAM:   General Appearance: Alert, cooperative, no distress  HEENT: Normocephalic, atraumatic, anicteric  Neck: Supple, symmetrical, trachea midline  Lungs: Clear to auscultation bilaterally; no rales, rhonchi or wheezing; respirations unlabored   Heart: Regular rate and rhythm; no murmur, rub, or gallop  Abdomen: Soft, bowel sounds normal, non-tender, non-distended; no masses, there is no hepatosplenomegaly  No spider angiomas  Genitalia: Deferred   Rectal: Deferred   Extremities: No cyanosis, clubbing or edema   Skin: No jaundice, rashes, or lesions   Lymph nodes: No palpable cervical lymphadenopathy   Lab Results:   No visits with results within 2 Month(s) from this visit     Latest known visit with results is:   Hospital Outpatient Visit on 11/13/2020   Component Date Value    Case Report 11/13/2020                      Value:Surgical Pathology Report                         Case: [id number]                                   Authorizing Provider:  Rand Morales DO       Collected:           11/13/2020 1429              Ordering Location:     [facility] Received:            11/13/2020 [facility]                                                            Pathologist:           Juan Arizmendi MD                                                   Specimens:   A) - Large Intestine, Transverse Colon, proximal transverse colon polyp x2                          B) - Large Intestine, Cecum, cecal polyp                                                            C) - Large Intestine, Right/Ascending Colon, mid ascending polyp D) - Large Intestine, Transverse Colon,  proximal transverse large polyp                            E) - Colon, polyp at 27                                                                    Final Diagnosis 11/13/2020                      Value: This result contains rich text formatting which cannot be displayed here   Additional Information 11/13/2020                      Value: This result contains rich text formatting which cannot be displayed here   Synoptic Checklist 11/13/2020                      Value:  (COLON/RECTUM POLYP FORM - GI - All Specimens)                                                                                                                 : Adenoma(s)     [place] Nena Vanegas Description 11/13/2020                      Value: This result contains rich text formatting which cannot be displayed here  Radiology Results:   No results found    One Suzy Louis DO   03/25/21   Cc:

## 2021-03-25 NOTE — ASSESSMENT & PLAN NOTE
Her vitamin-D level was very low back in July of 2020  She states she receive vitamin-D replacement by Dr Isis Weiss below  Check vitamin-D level

## 2021-03-25 NOTE — ASSESSMENT & PLAN NOTE
Yuly Novak was noted to have several colon polyps (6) which were all tubular adenomas  There is a 14 mm irregular sessile polyp in the proximal transverse colon that was removed by piecemeal technique with hot snare  This area was tattoo 2 cm distal to the lesion  There are 2 polyps measuring 3-8 mm in the proximal transverse colon removed by cold snare polypectomy  There is a 9 mm sessile polyp in the mid ascending colon removed by cold snare polypectomy  There was a 1 cm pedunculated polyp at 30 cm removed by hot snare  All polyps were tubular adenomas  Given piecemeal polypectomy, it is recommended she undergo repeat colonoscopy about 6 months after her last procedure  She will be scheduled for colonoscopy in near future  She will receive a MiraLax preparation in split dose with the 2nd dose completed 3 hours prior to arrival   Four bisacodyl tablets  I explained to the patient the procedure of colonoscopy as well as its potential risks which are approximately 3 in 1000 chance of bleeding, infection, and perforation  Perforation may require a surgeon to repair it  I also explained the small but significant chance of missing lesions with colonoscopy which has been reported to be about 5-10%

## 2021-06-10 ENCOUNTER — TELEPHONE (OUTPATIENT)
Dept: SURGERY | Facility: HOSPITAL | Age: 56
End: 2021-06-10

## 2021-06-11 ENCOUNTER — ANESTHESIA EVENT (OUTPATIENT)
Dept: GASTROENTEROLOGY | Facility: HOSPITAL | Age: 56
End: 2021-06-11

## 2021-06-11 ENCOUNTER — ANESTHESIA (OUTPATIENT)
Dept: GASTROENTEROLOGY | Facility: HOSPITAL | Age: 56
End: 2021-06-11

## 2021-06-11 ENCOUNTER — HOSPITAL ENCOUNTER (OUTPATIENT)
Dept: GASTROENTEROLOGY | Facility: HOSPITAL | Age: 56
Setting detail: OUTPATIENT SURGERY
Discharge: HOME | End: 2021-06-11
Attending: INTERNAL MEDICINE | Admitting: INTERNAL MEDICINE
Payer: COMMERCIAL

## 2021-06-11 VITALS
BODY MASS INDEX: 24.29 KG/M2 | SYSTOLIC BLOOD PRESSURE: 149 MMHG | TEMPERATURE: 96.9 F | RESPIRATION RATE: 16 BRPM | HEIGHT: 69 IN | OXYGEN SATURATION: 100 % | HEART RATE: 82 BPM | WEIGHT: 164 LBS | DIASTOLIC BLOOD PRESSURE: 72 MMHG

## 2021-06-11 DIAGNOSIS — Z86.010 HISTORY OF COLON POLYPS: ICD-10-CM

## 2021-06-11 PROCEDURE — 88305 TISSUE EXAM BY PATHOLOGIST: CPT | Performed by: PATHOLOGY

## 2021-06-11 PROCEDURE — 45385 COLONOSCOPY W/LESION REMOVAL: CPT | Performed by: INTERNAL MEDICINE

## 2021-06-11 RX ORDER — LIDOCAINE HYDROCHLORIDE 10 MG/ML
INJECTION, SOLUTION EPIDURAL; INFILTRATION; INTRACAUDAL; PERINEURAL AS NEEDED
Status: DISCONTINUED | OUTPATIENT
Start: 2021-06-11 | End: 2021-06-11

## 2021-06-11 RX ORDER — SODIUM CHLORIDE, SODIUM LACTATE, POTASSIUM CHLORIDE, CALCIUM CHLORIDE 600; 310; 30; 20 MG/100ML; MG/100ML; MG/100ML; MG/100ML
50 INJECTION, SOLUTION INTRAVENOUS CONTINUOUS
Status: DISCONTINUED | OUTPATIENT
Start: 2021-06-11 | End: 2021-06-15 | Stop reason: HOSPADM

## 2021-06-11 RX ORDER — PROPOFOL 10 MG/ML
INJECTION, EMULSION INTRAVENOUS AS NEEDED
Status: DISCONTINUED | OUTPATIENT
Start: 2021-06-11 | End: 2021-06-11

## 2021-06-11 RX ORDER — LIDOCAINE HYDROCHLORIDE 10 MG/ML
0.5 INJECTION, SOLUTION EPIDURAL; INFILTRATION; INTRACAUDAL; PERINEURAL ONCE AS NEEDED
Status: DISCONTINUED | OUTPATIENT
Start: 2021-06-11 | End: 2021-06-15 | Stop reason: HOSPADM

## 2021-06-11 RX ORDER — SODIUM CHLORIDE, SODIUM LACTATE, POTASSIUM CHLORIDE, CALCIUM CHLORIDE 600; 310; 30; 20 MG/100ML; MG/100ML; MG/100ML; MG/100ML
125 INJECTION, SOLUTION INTRAVENOUS CONTINUOUS
Status: DISCONTINUED | OUTPATIENT
Start: 2021-06-11 | End: 2021-06-15 | Stop reason: HOSPADM

## 2021-06-11 RX ADMIN — PROPOFOL 40 MG: 10 INJECTION, EMULSION INTRAVENOUS at 08:52

## 2021-06-11 RX ADMIN — PROPOFOL 100 MG: 10 INJECTION, EMULSION INTRAVENOUS at 08:36

## 2021-06-11 RX ADMIN — PROPOFOL 50 MG: 10 INJECTION, EMULSION INTRAVENOUS at 08:38

## 2021-06-11 RX ADMIN — PROPOFOL 20 MG: 10 INJECTION, EMULSION INTRAVENOUS at 08:59

## 2021-06-11 RX ADMIN — PROPOFOL 50 MG: 10 INJECTION, EMULSION INTRAVENOUS at 08:42

## 2021-06-11 RX ADMIN — PROPOFOL 40 MG: 10 INJECTION, EMULSION INTRAVENOUS at 08:48

## 2021-06-11 RX ADMIN — SODIUM CHLORIDE, SODIUM LACTATE, POTASSIUM CHLORIDE, AND CALCIUM CHLORIDE 50 ML/HR: .6; .31; .03; .02 INJECTION, SOLUTION INTRAVENOUS at 07:19

## 2021-06-11 RX ADMIN — PROPOFOL 20 MG: 10 INJECTION, EMULSION INTRAVENOUS at 08:57

## 2021-06-11 RX ADMIN — LIDOCAINE HYDROCHLORIDE 50 MG: 10 INJECTION, SOLUTION EPIDURAL; INFILTRATION; INTRACAUDAL; PERINEURAL at 08:36

## 2021-06-11 NOTE — ANESTHESIA POSTPROCEDURE EVALUATION
Post-Op Assessment Note    CV Status:  Stable    Pain management: adequate     Mental Status:  Sleepy   Hydration Status:  Euvolemic   PONV Controlled:  Controlled   Airway Patency:  Patent      Post Op Vitals Reviewed: Yes      Staff: CRNA   Comments: vss        No complications documented      BP      Temp     Pulse     Resp      SpO2

## 2021-06-11 NOTE — ANESTHESIA PREPROCEDURE EVALUATION
Procedure:  COLONOSCOPY    Relevant Problems   CARDIO   (+) Essential hypertension      NEURO/PSYCH   (+) History of colon polyps      CAD/PCI/MI/CHF -- denies  COPD/ASTHMA/MAGUI -- denies  PROBS WITH PRIOR ANESTHESIA -- denies  NPO STATUS CONFIRMED    Lab Results   Component Value Date    WBC 5 16 07/16/2020    HGB 15 2 07/16/2020     07/16/2020     Lab Results   Component Value Date    SODIUM 133 (L) 07/16/2020    K 4 2 07/16/2020    BUN 4 (L) 07/16/2020    CREATININE 0 60 07/16/2020    EGFR 104 07/16/2020     No results found for: PTT   No results found for: INR      Lab Results   Component Value Date    HGBA1C 5 3 07/16/2020           Physical Exam    Airway    Mallampati score: II  TM Distance: >3 FB       Dental   No notable dental hx     Cardiovascular      Pulmonary      Other Findings        Anesthesia Plan  ASA Score- 2     Anesthesia Type- IV sedation with anesthesia with ASA Monitors  Additional Monitors:   Airway Plan:     Comment: GIAN Roberts , have personally seen and evaluated the patient prior to anesthetic care  I have reviewed the pre-anesthetic record, and other medical records if appropriate to the anesthetic care  If a CRNA is involved in the case, I have reviewed the CRNA assessment, if present, and agree  Risks/benefits and alternatives discussed with patient including possible PONV, sore throat, and possibility of rare anesthetic and surgical emergencies          Plan Factors-    Chart reviewed  EKG reviewed  Imaging results reviewed  Existing labs reviewed  Patient summary reviewed  Patient is not a current smoker  Patient not instructed to abstain from smoking on day of procedure  Induction-     Postoperative Plan-     Informed Consent- Anesthetic plan and risks discussed with patient  I personally reviewed this patient with the CRNA  Discussed and agreed on the Anesthesia Plan with the CRNA  Parveen Ricks

## 2021-06-11 NOTE — H&P
History and Physical - [facility]  Xena White 54 y o  female MRN: [id number]                  HPI: Xena White is a 54y o  year old female who presents for colonoscopy to follow-up on piecemeal polypectomy  She was noted to have multiple colon polyps at time of her initial colonoscopy done for positive Cologuard  These polyps are outlined below  There is a 14 mm irregular sessile polyp in the proximal transverse colon that was removed by piecemeal technique with hot snare  This area was tattoo 2 cm distal to the lesion  There are 2 polyps measuring 3-8 mm in the proximal transverse colon removed by cold snare polypectomy  There is a 9 mm sessile polyp in the mid ascending colon removed by cold snare polypectomy  There was a 1 cm pedunculated polyp at 30 cm removed by hot snare  All polyps were tubular adenomas  Patient reports her bowel pattern has been very regular, denies any diarrhea constipation bleeding or black stools  From an upper GI standpoint denies any harm Doron dysphagia nausea vomiting  She does continue to drink about 3 beers per day  She does smoke about 1 pack of cigarettes per day  REVIEW OF SYSTEMS: Per the HPI, and otherwise unremarkable      Historical Information   Past Medical History:   Diagnosis Date    Anxiety     Colon polyp     Hypertension      Past Surgical History:   Procedure Laterality Date    BREAST CYST EXCISION Right 30yrs     benign    EYE SURGERY      TUBAL LIGATION       Social History   Social History     Substance and Sexual Activity   Alcohol Use Yes    Comment: 3 beer/day     Social History     Substance and Sexual Activity   Drug Use No     Social History     Tobacco Use   Smoking Status Current Every Day Smoker    Packs/day: 2 00    Types: Cigarettes   Smokeless Tobacco Never Used     Family History   Problem Relation Age of Onset    Heart disease Mother     Hypertension Mother     Cancer Mother     Heart disease Father     Hypertension Father     Aneurysm Father     No Known Problems Sister     No Known Problems Daughter     No Known Problems Maternal Grandmother     No Known Problems Paternal Grandmother     No Known Problems Sister     No Known Problems Sister     No Known Problems Daughter     No Known Problems Maternal Aunt     No Known Problems Maternal Aunt     No Known Problems Paternal Aunt        Meds/Allergies       Current Outpatient Medications:     lisinopril (ZESTRIL) 20 mg tablet    polyethylene glycol-electrolytes (TriLyte) 4000 mL solution    Current Facility-Administered Medications:     lactated ringers infusion, 50 mL/hr, Intravenous, Continuous, 50 mL/hr at 06/11/21 0719    lactated ringers infusion, 125 mL/hr, Intravenous, Continuous    lidocaine (PF) (XYLOCAINE-MPF) 1 % injection 0 5 mL, 0 5 mL, Infiltration, Once PRN    No Known Allergies    Objective     /71   Pulse 85   Temp (!) 96 8 °F (36 °C) (Temporal)   Resp 16   Ht 5' 9" (1 753 m)   Wt 74 4 kg (164 lb)   SpO2 100%   BMI 24 22 kg/m²       PHYSICAL EXAM    Gen: NAD  Head: NCAT  CV: RRR  CHEST: Clear  ABD: soft, NT/ND  EXT: no edema      ASSESSMENT/PLAN:  This is a 54y o  year old female here for colonoscopy, and she is stable and optimized for her procedure

## 2021-06-15 ENCOUNTER — TELEPHONE (OUTPATIENT)
Dept: GASTROENTEROLOGY | Facility: CLINIC | Age: 56
End: 2021-06-15

## 2021-06-15 NOTE — RESULT ENCOUNTER NOTE
Please inform patient that colon polyps were all benign, only 1 of them was pre cancerous, and it was completely removed  Please recall for repeat colonoscopy in 3 years    Thank you

## 2021-06-15 NOTE — TELEPHONE ENCOUNTER
----- Message from Bunny Winkler DO sent at 6/15/2021  3:00 PM EDT -----  Please inform patient that colon polyps were all benign, only 1 of them was pre cancerous, and it was completely removed  Please recall for repeat colonoscopy in 3 years    Thank you

## 2021-06-29 DIAGNOSIS — I10 ESSENTIAL HYPERTENSION: ICD-10-CM

## 2021-06-29 PROBLEM — R19.5 POSITIVE COLORECTAL CANCER SCREENING USING COLOGUARD TEST: Status: RESOLVED | Noted: 2020-08-05 | Resolved: 2021-06-29

## 2021-06-29 RX ORDER — LISINOPRIL 20 MG/1
20 TABLET ORAL DAILY
Qty: 90 TABLET | Refills: 1 | Status: SHIPPED | OUTPATIENT
Start: 2021-06-29 | End: 2021-06-30 | Stop reason: SDUPTHER

## 2021-06-29 NOTE — TELEPHONE ENCOUNTER
Patient requesting refill(s) of: lisinopril 20 mg daily    Last filled: 8/5/2020 #90 x 1  Last appt:8/5/2020  Next appt: none  Pharmacy: [facility]    Pt needs an appointment  Will call to schedule

## 2021-06-30 ENCOUNTER — OFFICE VISIT (OUTPATIENT)
Dept: FAMILY MEDICINE CLINIC | Facility: CLINIC | Age: 56
End: 2021-06-30
Payer: COMMERCIAL

## 2021-06-30 VITALS
SYSTOLIC BLOOD PRESSURE: 124 MMHG | DIASTOLIC BLOOD PRESSURE: 74 MMHG | RESPIRATION RATE: 20 BRPM | OXYGEN SATURATION: 99 % | HEART RATE: 97 BPM | HEIGHT: 69 IN | TEMPERATURE: 98.5 F | BODY MASS INDEX: 24.88 KG/M2 | WEIGHT: 168 LBS

## 2021-06-30 DIAGNOSIS — R74.8 ELEVATED LIVER ENZYMES: ICD-10-CM

## 2021-06-30 DIAGNOSIS — Z12.31 ENCOUNTER FOR SCREENING MAMMOGRAM FOR BREAST CANCER: ICD-10-CM

## 2021-06-30 DIAGNOSIS — E78.2 MIXED HYPERLIPIDEMIA: ICD-10-CM

## 2021-06-30 DIAGNOSIS — Z12.2 ENCOUNTER FOR SCREENING FOR LUNG CANCER: ICD-10-CM

## 2021-06-30 DIAGNOSIS — F17.200 TOBACCO USE DISORDER: ICD-10-CM

## 2021-06-30 DIAGNOSIS — I10 ESSENTIAL HYPERTENSION: ICD-10-CM

## 2021-06-30 DIAGNOSIS — Z00.00 ANNUAL PHYSICAL EXAM: Primary | ICD-10-CM

## 2021-06-30 DIAGNOSIS — E55.9 VITAMIN D DEFICIENCY: ICD-10-CM

## 2021-06-30 PROCEDURE — 99396 PREV VISIT EST AGE 40-64: CPT | Performed by: INTERNAL MEDICINE

## 2021-06-30 PROCEDURE — 4004F PT TOBACCO SCREEN RCVD TLK: CPT | Performed by: INTERNAL MEDICINE

## 2021-06-30 PROCEDURE — 3074F SYST BP LT 130 MM HG: CPT | Performed by: INTERNAL MEDICINE

## 2021-06-30 PROCEDURE — 3008F BODY MASS INDEX DOCD: CPT | Performed by: INTERNAL MEDICINE

## 2021-06-30 PROCEDURE — 99214 OFFICE O/P EST MOD 30 MIN: CPT | Performed by: INTERNAL MEDICINE

## 2021-06-30 PROCEDURE — 3078F DIAST BP <80 MM HG: CPT | Performed by: INTERNAL MEDICINE

## 2021-06-30 RX ORDER — LISINOPRIL 20 MG/1
20 TABLET ORAL DAILY
Qty: 90 TABLET | Refills: 1 | Status: SHIPPED | OUTPATIENT
Start: 2021-06-30 | End: 2022-01-19 | Stop reason: SDUPTHER

## 2021-06-30 NOTE — PROGRESS NOTES
[facility]        NAME: Abi Carney is a 54 y o  female  : 1965    MRN: [id number]  DATE: 2021  TIME: 2:16 PM    Assessment and Plan   1  Essential hypertension  -BP well-controlled, continue lisinopril, check urine studies, follow up in 6 months     -  CBC and differential; Future  -     Comprehensive metabolic panel; Future  -     Urinalysis with microscopic  -     Protein / creatinine ratio, urine  -     lisinopril (ZESTRIL) 20 mg tablet; Take 1 tablet (20 mg total) by mouth daily    2  Tobacco use disorder  - 90 pack year history, she is not interested in quitting at this time  We did discuss lung cancer screening     3  Vitamin D deficiency  - previously very low at 9 8, she did complete short-course of high dose repletion but has done repeat labs yet, she is not currently taking supplementation    4  Elevated liver enzymes  -ALT 44 in September   -    Comprehensive metabolic panel; Future    5  Annual physical exam  - refer to annual PE note    6  Mixed hyperlipidemia  -  LDL high at 130 last July  -   Lipid Panel with Direct LDL reflex; Future    7  Encounter for screening mammogram for breast cancer  -     Mammo screening bilateral w 3d & cad; Future; Expected date: 2021    8  Encounter for screening for lung cancer  -     CT lung screening program; Future; Expected date: 2021             Chief Complaint     Chief Complaint   Patient presents with    Annual Exam         History of Present Illness       Here for follow up HTN and annual physical  Doing well, no concerns today  HTN: taking lisinopril, denies chest pain, headaches, dizziness  Colonic polyps: had colonoscopy recently, benign pathology, has recall in 3 years  Tobacco use: still smoking 1 5-2 ppd, smoked 44 years  Social hx: works in [employer], drinks beer 3-4 daily         Review of Systems   Review of Systems   Constitutional: Negative for chills, fatigue, fever and unexpected weight change  HENT: Negative for congestion, postnasal drip, rhinorrhea, sore throat, trouble swallowing and voice change  Eyes: Negative for pain, redness, itching and visual disturbance  Respiratory: Negative for cough, chest tightness, shortness of breath and wheezing  Cardiovascular: Negative for chest pain, palpitations and leg swelling  Gastrointestinal: Negative for abdominal pain, blood in stool, constipation, diarrhea, nausea and vomiting  Endocrine: Negative for cold intolerance, heat intolerance, polydipsia and polyuria  Genitourinary: Negative for dysuria, frequency, hematuria and urgency  Musculoskeletal: Negative for arthralgias, back pain and joint swelling  Skin: Negative for rash  Neurological: Negative for dizziness, tremors, weakness, light-headedness, numbness and headaches  Psychiatric/Behavioral: Negative for confusion, dysphoric mood, hallucinations and suicidal ideas  The patient is not nervous/anxious            Current Medications       Current Outpatient Medications:     lisinopril (ZESTRIL) 20 mg tablet, Take 1 tablet (20 mg total) by mouth daily, Disp: 90 tablet, Rfl: 1    Current Allergies     Allergies as of 06/30/2021    (No Known Allergies)            The following portions of the patient's history were reviewed and updated as appropriate: allergies, current medications, past family history, past medical history, past social history, past surgical history and problem list      Past Medical History:   Diagnosis Date    Anxiety     Colon polyp     Hypertension        Past Surgical History:   Procedure Laterality Date    BREAST CYST EXCISION Right 30yrs     benign    EYE SURGERY      TUBAL LIGATION         Family History   Problem Relation Age of Onset    Heart disease Mother     Hypertension Mother     Cancer Mother     Heart disease Father     Hypertension Father     Aneurysm Father     No Known Problems Sister     No Known Problems Daughter  No Known Problems Maternal Grandmother     No Known Problems Paternal Grandmother     No Known Problems Sister     No Known Problems Sister     No Known Problems Daughter     No Known Problems Maternal Aunt     No Known Problems Maternal Aunt     No Known Problems Paternal Aunt          Medications have been verified  Objective   /74 (BP Location: Left arm, Patient Position: Sitting, Cuff Size: Standard)   Pulse 97   Temp 98 5 °F (36 9 °C)   Resp 20   Ht 5' 9" (1 753 m)   Wt 76 2 kg (168 lb)   SpO2 99%   BMI 24 81 kg/m²        Physical Exam     Physical Exam  Constitutional:       General: She is not in acute distress  Appearance: She is well-developed and normal weight  HENT:      Head: Normocephalic and atraumatic  Right Ear: Tympanic membrane, ear canal and external ear normal       Left Ear: Tympanic membrane, ear canal and external ear normal       Mouth/Throat:      Pharynx: No oropharyngeal exudate  Eyes:      General: No scleral icterus  Conjunctiva/sclera: Conjunctivae normal    Neck:      Thyroid: No thyromegaly  Vascular: No JVD  Cardiovascular:      Rate and Rhythm: Normal rate and regular rhythm  Pulses: Normal pulses  Heart sounds: Normal heart sounds  No murmur heard  No friction rub  No gallop  Pulmonary:      Effort: Pulmonary effort is normal  No respiratory distress  Breath sounds: Normal breath sounds  No wheezing, rhonchi or rales  Chest:      Chest wall: No tenderness  Abdominal:      General: Abdomen is flat  Bowel sounds are normal  There is no distension  Palpations: Abdomen is soft  There is no mass  Tenderness: There is no abdominal tenderness  There is no guarding or rebound  Hernia: No hernia is present  Musculoskeletal:         General: No deformity  Normal range of motion  Cervical back: Normal range of motion and neck supple  Right lower leg: No edema        Left lower leg: No edema    Lymphadenopathy:      Cervical: No cervical adenopathy  Skin:     General: Skin is warm and dry  Capillary Refill: Capillary refill takes less than 2 seconds  Neurological:      General: No focal deficit present  Mental Status: She is alert and oriented to person, place, and time  Psychiatric:         Mood and Affect: Mood and affect normal          Speech: Speech normal          Behavior: Behavior normal  Behavior is cooperative  Thought Content:  Thought content normal          Judgment: Judgment normal              Results:  Lab Results   Component Value Date    SODIUM 133 (L) 07/16/2020    K 4 2 07/16/2020     07/16/2020    CO2 26 07/16/2020    BUN 4 (L) 07/16/2020    CREATININE 0 60 07/16/2020    CALCIUM 9 1 07/16/2020       Lab Results   Component Value Date    HGBA1C 5 3 07/16/2020       Lab Results   Component Value Date    WBC 5 16 07/16/2020    HGB 15 2 07/16/2020    HCT 44 5 07/16/2020    MCV 97 07/16/2020     07/16/2020

## 2021-06-30 NOTE — PATIENT INSTRUCTIONS
Wellness Visit for Adults   AMBULATORY CARE:   A wellness visit  is when you see your healthcare provider to get screened for health problems  Your healthcare provider will also give you advice on how to stay healthy  Write down your questions so you remember to ask them  Ask your healthcare provider how often you should have a wellness visit  What happens at a wellness visit:  Your healthcare provider will ask about your health, and your family history of health problems  This includes high blood pressure, heart disease, and cancer  He or she will ask if you have symptoms that concern you, if you smoke, and about your mood  You may also be asked about your intake of medicines, supplements, food, and alcohol  Any of the following may be done:  · Your weight  will be checked  Your height may also be checked so your body mass index (BMI) can be calculated  Your BMI shows if you are at a healthy weight  · Your blood pressure  and heart rate will be checked  Your temperature may also be checked  · Blood and urine tests  may be done  Blood tests may be done to check your cholesterol levels  Abnormal cholesterol levels increase your risk for heart disease and stroke  You may also need a blood or urine test to check for diabetes if you are at increased risk  Urine tests may be done to look for signs of an infection or kidney disease  · A physical exam  includes checking your heartbeat and lungs with a stethoscope  Your healthcare provider may also check your skin to look for sun damage  · Screening tests  may be recommended  A screening test is done to check for diseases that may not cause symptoms  The screening tests you may need depend on your age, gender, family history, and lifestyle habits  For example, colorectal screening may be recommended if you are 48years old or older  Screening tests you need if you are a woman:   · A Pap smear  is used to screen for cervical cancer   Pap smears are usually done every 3 to 5 years depending on your age  You may need them more often if you have had abnormal Pap smear test results in the past  Ask your healthcare provider how often you should have a Pap smear  · A mammogram  is an x-ray of your breasts to screen for breast cancer  Experts recommend mammograms every 2 years starting at age 48 years  You may need a mammogram at age 52 years or younger if you have an increased risk for breast cancer  Talk to your healthcare provider about when you should start having mammograms and how often you need them  Vaccines you may need:   · Get an influenza vaccine  every year  The influenza vaccine protects you from the flu  Several types of viruses cause the flu  The viruses change over time, so new vaccines are made each year  · Get a tetanus-diphtheria (Td) booster vaccine  every 10 years  This vaccine protects you against tetanus and diphtheria  Tetanus is a severe infection that may cause painful muscle spasms and lockjaw  Diphtheria is a severe bacterial infection that causes a thick covering in the back of your mouth and throat  · Get a human papillomavirus (HPV) vaccine  if you are female and aged 23 to 32 or male 23 to 24 and never received it  This vaccine protects you from HPV infection  HPV is the most common infection spread by sexual contact  HPV may also cause vaginal, penile, and anal cancers  · Get a pneumococcal vaccine  if you are aged 72 years or older  The pneumococcal vaccine is an injection given to protect you from pneumococcal disease  Pneumococcal disease is an infection caused by pneumococcal bacteria  The infection may cause pneumonia, meningitis, or an ear infection  · Get a shingles vaccine  if you are 60 or older, even if you have had shingles before  The shingles vaccine is an injection to protect you from the varicella-zoster virus  This is the same virus that causes chickenpox   Shingles is a painful rash that develops in people who had chickenpox or have been exposed to the virus  How to eat healthy:  My Plate is a model for planning healthy meals  It shows the types and amounts of foods that should go on your plate  Fruits and vegetables make up about half of your plate, and grains and protein make up the other half  A serving of dairy is included on the side of your plate  The amount of calories and serving sizes you need depends on your age, gender, weight, and height  Examples of healthy foods are listed below:  · Eat a variety of vegetables  such as dark green, red, and orange vegetables  You can also include canned vegetables low in sodium (salt) and frozen vegetables without added butter or sauces  · Eat a variety of fresh fruits , canned fruit in 100% juice, frozen fruit, and dried fruit  · Include whole grains  At least half of the grains you eat should be whole grains  Examples include whole-wheat bread, wheat pasta, brown rice, and whole-grain cereals such as oatmeal     · Eat a variety of protein foods such as seafood (fish and shellfish), lean meat, and poultry without skin (turkey and chicken)  Examples of lean meats include pork leg, shoulder, or tenderloin, and beef round, sirloin, tenderloin, and extra lean ground beef  Other protein foods include eggs and egg substitutes, beans, peas, soy products, nuts, and seeds  · Choose low-fat dairy products such as skim or 1% milk or low-fat yogurt, cheese, and cottage cheese  · Limit unhealthy fats  such as butter, hard margarine, and shortening  Exercise:  Exercise at least 30 minutes per day on most days of the week  Some examples of exercise include walking, biking, dancing, and swimming  You can also fit in more physical activity by taking the stairs instead of the elevator or parking farther away from stores  Include muscle strengthening activities 2 days each week  Regular exercise provides many health benefits   It helps you manage your weight, and decreases your risk for type 2 diabetes, heart disease, stroke, and high blood pressure  Exercise can also help improve your mood  Ask your healthcare provider about the best exercise plan for you  General health and safety guidelines:   · Do not smoke  Nicotine and other chemicals in cigarettes and cigars can cause lung damage  Ask your healthcare provider for information if you currently smoke and need help to quit  E-cigarettes or smokeless tobacco still contain nicotine  Talk to your healthcare provider before you use these products  · Limit alcohol  A drink of alcohol is 12 ounces of beer, 5 ounces of wine, or 1½ ounces of liquor  · Lose weight, if needed  Being overweight increases your risk of certain health conditions  These include heart disease, high blood pressure, type 2 diabetes, and certain types of cancer  · Protect your skin  Do not sunbathe or use tanning beds  Use sunscreen with a SPF 15 or higher  Apply sunscreen at least 15 minutes before you go outside  Reapply sunscreen every 2 hours  Wear protective clothing, hats, and sunglasses when you are outside  · Drive safely  Always wear your seatbelt  Make sure everyone in your car wears a seatbelt  A seatbelt can save your life if you are in an accident  Do not use your cell phone when you are driving  This could distract you and cause an accident  Pull over if you need to make a call or send a text message  · Practice safe sex  Use latex condoms if are sexually active and have more than one partner  Your healthcare provider may recommend screening tests for sexually transmitted infections (STIs)  · Wear helmets, lifejackets, and protective gear  Always wear a helmet when you ride a bike or motorcycle, go skiing, or play sports that could cause a head injury  Wear protective equipment when you play sports  Wear a lifejacket when you are on a boat or doing water sports      © Copyright [employer] 2020 Information is for End User's use only and may not be sold, redistributed or otherwise used for commercial purposes  All illustrations and images included in CareNotes® are the copyrighted property of [employer] M [employer]  or Cailin Chavez  The above information is an  only  It is not intended as medical advice for individual conditions or treatments  Talk to your doctor, nurse or pharmacist before following any medical regimen to see if it is safe and effective for you  Cigarette Smoking and Your Health   AMBULATORY CARE:   Risks to your health if you smoke:  Nicotine and other chemicals found in tobacco and e-cigarettes can damage every cell in your body  Even if you are a light smoker, you have an increased risk for cancer, heart disease, and lung disease  If you are pregnant or have diabetes, smoking increases your risk for complications  Nicotine can affect an adolescent's developing brain  This can lead to trouble thinking, learning, or paying attention  Benefits to your health if you stop smoking:   · You decrease respiratory symptoms such as coughing, wheezing, and shortness of breath  · You reduce your risk for cancers of the lung, mouth, throat, kidney, bladder, pancreas, stomach, and cervix  If you already have cancer, you increase the benefits of chemotherapy  You also reduce your risk for cancer returning or a second cancer from developing  · You reduce your risk for heart disease, blood clots, heart attack, and stroke  · You reduce your risk for lung infections, and diseases such as pneumonia, asthma, chronic bronchitis, and emphysema  · Your circulation improves  More oxygen can be delivered to your body  If you have diabetes, you lower your risk for complications, such as kidney, artery, and eye diseases  You also lower your risk for nerve damage  Nerve damage can lead to amputations, poor vision, and blindness      · You improve your body's ability to heal and to fight infections  · An adolescent can help his or her brain and body develop in a healthy way  Talk to your adolescent about all the health risks of nicotine  If you can, start talking about nicotine when your child is younger than 12 years  This may make it easier for him or her not to start using nicotine as a teenager or adult  Explain to him or her that it is best never to start  It can be hard to try to quit later  Benefits to the health of others if you stop smoking:  Tobacco is harmful to nonsmokers who breathe in your secondhand smoke  The following are ways the health of others around you may improve when you stop smoking:  · You lower the risks for lung cancer and heart disease in nonsmoking adults  · If you are pregnant, you lower the risk for miscarriage, early delivery, low birth weight, and stillbirth  You also lower your baby's risk for SIDS, obesity, developmental delay, and neurobehavioral problems, such as ADHD  · If you have children, you lower their risk for ear infections, colds, pneumonia, bronchitis, and asthma  Follow up with your doctor as directed:  Write down your questions so you remember to ask them during your visits  For more information and support to stop smoking:   · [employer]  gov  Phone: 9- 949 - 491-9672  Web Address: www Tellwiki  [address] Information is for End User's use only and may not be sold, redistributed or otherwise used for commercial purposes  All illustrations and images included in CareNotes® are the copyrighted property of [employer] D A M [employer] Inc  or [facility]   The above information is an  only  It is not intended as medical advice for individual conditions or treatments  Talk to your doctor, nurse or pharmacist before following any medical regimen to see if it is safe and effective for you      Wellness Visit for Adults   AMBULATORY CARE:   A wellness visit  is when you see your healthcare provider to get screened for health problems  Your healthcare provider will also give you advice on how to stay healthy  Write down your questions so you remember to ask them  Ask your healthcare provider how often you should have a wellness visit  What happens at a wellness visit:  Your healthcare provider will ask about your health, and your family history of health problems  This includes high blood pressure, heart disease, and cancer  He or she will ask if you have symptoms that concern you, if you smoke, and about your mood  You may also be asked about your intake of medicines, supplements, food, and alcohol  Any of the following may be done:  · Your weight  will be checked  Your height may also be checked so your body mass index (BMI) can be calculated  Your BMI shows if you are at a healthy weight  · Your blood pressure  and heart rate will be checked  Your temperature may also be checked  · Blood and urine tests  may be done  Blood tests may be done to check your cholesterol levels  Abnormal cholesterol levels increase your risk for heart disease and stroke  You may also need a blood or urine test to check for diabetes if you are at increased risk  Urine tests may be done to look for signs of an infection or kidney disease  · A physical exam  includes checking your heartbeat and lungs with a stethoscope  Your healthcare provider may also check your skin to look for sun damage  · Screening tests  may be recommended  A screening test is done to check for diseases that may not cause symptoms  The screening tests you may need depend on your age, gender, family history, and lifestyle habits  For example, colorectal screening may be recommended if you are 48years old or older  Screening tests you need if you are a woman:   · A Pap smear  is used to screen for cervical cancer  Pap smears are usually done every 3 to 5 years depending on your age   You may need them more often if you have had abnormal Pap smear test results in the past  Ask your healthcare provider how often you should have a Pap smear  · A mammogram  is an x-ray of your breasts to screen for breast cancer  Experts recommend mammograms every 2 years starting at age 48 years  You may need a mammogram at age 52 years or younger if you have an increased risk for breast cancer  Talk to your healthcare provider about when you should start having mammograms and how often you need them  Vaccines you may need:   · Get an influenza vaccine  every year  The influenza vaccine protects you from the flu  Several types of viruses cause the flu  The viruses change over time, so new vaccines are made each year  · Get a tetanus-diphtheria (Td) booster vaccine  every 10 years  This vaccine protects you against tetanus and diphtheria  Tetanus is a severe infection that may cause painful muscle spasms and lockjaw  Diphtheria is a severe bacterial infection that causes a thick covering in the back of your mouth and throat  · Get a human papillomavirus (HPV) vaccine  if you are female and aged 23 to 32 or male 23 to 24 and never received it  This vaccine protects you from HPV infection  HPV is the most common infection spread by sexual contact  HPV may also cause vaginal, penile, and anal cancers  · Get a pneumococcal vaccine  if you are aged 72 years or older  The pneumococcal vaccine is an injection given to protect you from pneumococcal disease  Pneumococcal disease is an infection caused by pneumococcal bacteria  The infection may cause pneumonia, meningitis, or an ear infection  · Get a shingles vaccine  if you are 60 or older, even if you have had shingles before  The shingles vaccine is an injection to protect you from the varicella-zoster virus  This is the same virus that causes chickenpox  Shingles is a painful rash that develops in people who had chickenpox or have been exposed to the virus      How to eat healthy:  My Plate is a model for planning healthy meals  It shows the types and amounts of foods that should go on your plate  Fruits and vegetables make up about half of your plate, and grains and protein make up the other half  A serving of dairy is included on the side of your plate  The amount of calories and serving sizes you need depends on your age, gender, weight, and height  Examples of healthy foods are listed below:  · Eat a variety of vegetables  such as dark green, red, and orange vegetables  You can also include canned vegetables low in sodium (salt) and frozen vegetables without added butter or sauces  · Eat a variety of fresh fruits , canned fruit in 100% juice, frozen fruit, and dried fruit  · Include whole grains  At least half of the grains you eat should be whole grains  Examples include whole-wheat bread, wheat pasta, brown rice, and whole-grain cereals such as oatmeal     · Eat a variety of protein foods such as seafood (fish and shellfish), lean meat, and poultry without skin (turkey and chicken)  Examples of lean meats include pork leg, shoulder, or tenderloin, and beef round, sirloin, tenderloin, and extra lean ground beef  Other protein foods include eggs and egg substitutes, beans, peas, soy products, nuts, and seeds  · Choose low-fat dairy products such as skim or 1% milk or low-fat yogurt, cheese, and cottage cheese  · Limit unhealthy fats  such as butter, hard margarine, and shortening  Exercise:  Exercise at least 30 minutes per day on most days of the week  Some examples of exercise include walking, biking, dancing, and swimming  You can also fit in more physical activity by taking the stairs instead of the elevator or parking farther away from stores  Include muscle strengthening activities 2 days each week  Regular exercise provides many health benefits  It helps you manage your weight, and decreases your risk for type 2 diabetes, heart disease, stroke, and high blood pressure   Exercise can also help improve your mood  Ask your healthcare provider about the best exercise plan for you  General health and safety guidelines:   · Do not smoke  Nicotine and other chemicals in cigarettes and cigars can cause lung damage  Ask your healthcare provider for information if you currently smoke and need help to quit  E-cigarettes or smokeless tobacco still contain nicotine  Talk to your healthcare provider before you use these products  · Limit alcohol  A drink of alcohol is 12 ounces of beer, 5 ounces of wine, or 1½ ounces of liquor  · Lose weight, if needed  Being overweight increases your risk of certain health conditions  These include heart disease, high blood pressure, type 2 diabetes, and certain types of cancer  · Protect your skin  Do not sunbathe or use tanning beds  Use sunscreen with a SPF 15 or higher  Apply sunscreen at least 15 minutes before you go outside  Reapply sunscreen every 2 hours  Wear protective clothing, hats, and sunglasses when you are outside  · Drive safely  Always wear your seatbelt  Make sure everyone in your car wears a seatbelt  A seatbelt can save your life if you are in an accident  Do not use your cell phone when you are driving  This could distract you and cause an accident  Pull over if you need to make a call or send a text message  · Practice safe sex  Use latex condoms if are sexually active and have more than one partner  Your healthcare provider may recommend screening tests for sexually transmitted infections (STIs)  · Wear helmets, lifejackets, and protective gear  Always wear a helmet when you ride a bike or motorcycle, go skiing, or play sports that could cause a head injury  Wear protective equipment when you play sports  Wear a lifejacket when you are on a boat or doing water sports  © Copyright [address] Information is for End User's use only and may not be sold, redistributed or otherwise used for commercial purposes   All illustrations and images included in CareNotes® are the copyrighted property of [employer]  or Cailin Chavez  The above information is an  only  It is not intended as medical advice for individual conditions or treatments  Talk to your doctor, nurse or pharmacist before following any medical regimen to see if it is safe and effective for you

## 2021-06-30 NOTE — PROGRESS NOTES
ADULT ANNUAL [facility]    NAME: Mary Jane Mills  AGE: 54 y o  SEX: female  : 1965     DATE: 2021     Assessment and Plan:     Problem List Items Addressed This Visit        Cardiovascular and Mediastinum    Essential hypertension    Relevant Medications    lisinopril (ZESTRIL) 20 mg tablet    Other Relevant Orders    CBC and differential    Comprehensive metabolic panel    Urinalysis with microscopic    Protein / creatinine ratio, urine       Other    Tobacco use disorder    Vitamin D deficiency    Elevated liver enzymes    Relevant Orders    Comprehensive metabolic panel      Other Visit Diagnoses     Annual physical exam    -  Primary    Mixed hyperlipidemia        Relevant Orders    Lipid Panel with Direct LDL reflex    Encounter for screening mammogram for breast cancer        Relevant Orders    Mammo screening bilateral w 3d & cad    Encounter for screening for lung cancer        Relevant Orders    CT lung screening program          Immunizations and preventive care screenings were discussed with patient today  Appropriate education was printed on patient's after visit summary  Counseling:  Alcohol/drug use: discussed moderation in alcohol intake, the recommendations for healthy alcohol use, and avoidance of illicit drug use  Dental Health: discussed importance of regular tooth brushing, flossing, and dental visits  Injury prevention: discussed safety/seat belts, safety helmets, smoke detectors, carbon dioxide detectors, and smoking near bedding or upholstery  Sexual health: discussed sexually transmitted diseases, partner selection, use of condoms, avoidance of unintended pregnancy, and contraceptive alternatives  · Exercise: the importance of regular exercise/physical activity was discussed  Recommend exercise 3-5 times per week for at least 30 minutes            Return in about 6 months (around 2021) for Recheck  Chief Complaint:     Chief Complaint   Patient presents with    Annual Exam      History of Present Illness:     Adult Annual Physical   Patient here for a comprehensive physical exam  The patient reports problems - refer to separate note  Diet and Physical Activity  · Diet/Nutrition: well balanced diet and limited junk food  · Exercise: walking  Depression Screening  PHQ-9 Depression Screening    PHQ-9:   Frequency of the following problems over the past two weeks:           General Health  · Sleep: sleeps well and gets 4-6 hours of sleep on average  · Hearing: normal - bilateral   · Vision: no vision problems and most recent eye exam <1 year ago  · Dental: has dentures         /GYN Health  · Patient is: postmenopausal  · Last menstrual period: NA  · Contraceptive method: NA      Review of Systems:     Review of Systems   Refer to separate note     Past Medical History:     Past Medical History:   Diagnosis Date    Anxiety     Colon polyp     Hypertension       Past Surgical History:     Past Surgical History:   Procedure Laterality Date    BREAST CYST EXCISION Right 30yrs     benign    EYE SURGERY      TUBAL LIGATION        Social History:     Social History     Socioeconomic History    Marital status: Single     Spouse name: None    Number of children: None    Years of education: None    Highest education level: None   Occupational History    None   Tobacco Use    Smoking status: Current Every Day Smoker     Packs/day: 2 00     Years: 45 00     Pack years: 90 00     Types: Cigarettes    Smokeless tobacco: Never Used   Vaping Use    Vaping Use: Never used   Substance and Sexual Activity    Alcohol use: Yes     Comment: 3 beer/day    Drug use: No    Sexual activity: None   Other Topics Concern    None   Social History Narrative    None     Social Determinants of Health     Financial Resource Strain:     Difficulty of Paying Living Expenses:    Food Insecurity:     Worried About [address] [facility] in the Last Year:    [address] in the Last Year:    Transportation Needs:     Lack of Transportation (Medical):  Lack of Transportation (Non-Medical):    Physical Activity:     Days of Exercise per Week:     Minutes of Exercise per Session:    Stress:     Feeling of Stress :    Social Connections:     Frequency of Communication with Friends and Family:     Frequency of Social Gatherings with Friends and Family:     Attends [employer] Services:     Active Member of Clubs or Organizations:     Attends Club or Organization Meetings:     Marital Status:    Intimate Partner Violence:     Fear of Current or Ex-Partner:     Emotionally Abused:     Physically Abused:     Sexually Abused:       Family History:     Family History   Problem Relation Age of Onset    Heart disease Mother     Hypertension Mother     Cancer Mother     Heart disease Father     Hypertension Father     Aneurysm Father     No Known Problems Sister     No Known Problems Daughter     No Known Problems Maternal Grandmother     No Known Problems Paternal Grandmother     No Known Problems Sister     No Known Problems Sister     No Known Problems Daughter     No Known Problems Maternal Aunt     No Known Problems Maternal Aunt     No Known Problems Paternal Aunt       Current Medications:     Current Outpatient Medications   Medication Sig Dispense Refill    lisinopril (ZESTRIL) 20 mg tablet Take 1 tablet (20 mg total) by mouth daily 90 tablet 1     No current facility-administered medications for this visit        Allergies:     No Known Allergies   Physical Exam:     /74 (BP Location: Left arm, Patient Position: Sitting, Cuff Size: Standard)   Pulse 97   Temp 98 5 °F (36 9 °C)   Resp 20   Ht 5' 9" (1 753 m)   Wt 76 2 kg (168 lb)   SpO2 99%   BMI 24 81 kg/m²     Physical Exam   Refer to separate note    Eliezer Marcelino MD  [place] [facility]

## 2021-08-20 ENCOUNTER — TELEPHONE (OUTPATIENT)
Dept: GASTROENTEROLOGY | Facility: CLINIC | Age: 56
End: 2021-08-20

## 2021-08-20 DIAGNOSIS — R16.0 HEPATOMEGALY: Primary | ICD-10-CM

## 2021-08-20 DIAGNOSIS — R74.8 ABNORMAL LEVELS OF OTHER SERUM ENZYMES: Primary | ICD-10-CM

## 2021-08-20 NOTE — TELEPHONE ENCOUNTER
Per last ultrasound result in 9/2020:    "Please inform patient that ultrasound of the abdomen revealed mild fatty infiltration, this can occur from alcohol   As discussed in the office suggest decreasing alcohol intake   Spleen appeared normal   There was a small 3 mm gallbladder polyp, these are benign   Would recommend repeating ultrasound in 1 year   Please recall for repeat ultrasound in 1 year  Margarita becker"    Order pending for repeat ultrasound as of 9/2021  Please advise  Thank you!

## 2021-08-26 NOTE — TELEPHONE ENCOUNTER
Placed call to patient and left a message to return our call  Unable to reach letter sent and mychart message sent

## 2021-10-07 ENCOUNTER — LAB (OUTPATIENT)
Dept: LAB | Facility: CLINIC | Age: 56
End: 2021-10-07
Payer: COMMERCIAL

## 2021-10-07 DIAGNOSIS — E55.9 VITAMIN D DEFICIENCY: ICD-10-CM

## 2021-10-07 DIAGNOSIS — I10 ESSENTIAL HYPERTENSION: ICD-10-CM

## 2021-10-07 DIAGNOSIS — R74.8 ELEVATED LIVER ENZYMES: ICD-10-CM

## 2021-10-07 DIAGNOSIS — E78.2 MIXED HYPERLIPIDEMIA: ICD-10-CM

## 2021-10-07 LAB
25(OH)D3 SERPL-MCNC: 21 NG/ML (ref 30–100)
ALBUMIN SERPL BCP-MCNC: 3.4 G/DL (ref 3.5–5)
ALP SERPL-CCNC: 98 U/L (ref 46–116)
ALT SERPL W P-5'-P-CCNC: 94 U/L (ref 12–78)
ANION GAP SERPL CALCULATED.3IONS-SCNC: 5 MMOL/L (ref 4–13)
AST SERPL W P-5'-P-CCNC: 72 U/L (ref 5–45)
BASOPHILS # BLD AUTO: 0.06 THOUSANDS/ΜL (ref 0–0.1)
BASOPHILS NFR BLD AUTO: 1 % (ref 0–1)
BILIRUB DIRECT SERPL-MCNC: 0.1 MG/DL (ref 0–0.2)
BILIRUB SERPL-MCNC: 0.24 MG/DL (ref 0.2–1)
BUN SERPL-MCNC: 5 MG/DL (ref 5–25)
CALCIUM ALBUM COR SERPL-MCNC: 9.4 MG/DL (ref 8.3–10.1)
CALCIUM SERPL-MCNC: 8.9 MG/DL (ref 8.3–10.1)
CHLORIDE SERPL-SCNC: 105 MMOL/L (ref 100–108)
CHOLEST SERPL-MCNC: 202 MG/DL (ref 50–200)
CO2 SERPL-SCNC: 24 MMOL/L (ref 21–32)
CREAT SERPL-MCNC: 0.55 MG/DL (ref 0.6–1.3)
EOSINOPHIL # BLD AUTO: 0.15 THOUSAND/ΜL (ref 0–0.61)
EOSINOPHIL NFR BLD AUTO: 3 % (ref 0–6)
ERYTHROCYTE [DISTWIDTH] IN BLOOD BY AUTOMATED COUNT: 12.6 % (ref 11.6–15.1)
GFR SERPL CREATININE-BSD FRML MDRD: 106 ML/MIN/1.73SQ M
GLUCOSE P FAST SERPL-MCNC: 87 MG/DL (ref 65–99)
HCT VFR BLD AUTO: 43.2 % (ref 34.8–46.1)
HDLC SERPL-MCNC: 58 MG/DL
HGB BLD-MCNC: 14.9 G/DL (ref 11.5–15.4)
IMM GRANULOCYTES # BLD AUTO: 0.02 THOUSAND/UL (ref 0–0.2)
IMM GRANULOCYTES NFR BLD AUTO: 0 % (ref 0–2)
LDLC SERPL CALC-MCNC: 104 MG/DL (ref 0–100)
LYMPHOCYTES # BLD AUTO: 1.9 THOUSANDS/ΜL (ref 0.6–4.47)
LYMPHOCYTES NFR BLD AUTO: 39 % (ref 14–44)
MCH RBC QN AUTO: 33.4 PG (ref 26.8–34.3)
MCHC RBC AUTO-ENTMCNC: 34.5 G/DL (ref 31.4–37.4)
MCV RBC AUTO: 97 FL (ref 82–98)
MONOCYTES # BLD AUTO: 0.53 THOUSAND/ΜL (ref 0.17–1.22)
MONOCYTES NFR BLD AUTO: 11 % (ref 4–12)
NEUTROPHILS # BLD AUTO: 2.18 THOUSANDS/ΜL (ref 1.85–7.62)
NEUTS SEG NFR BLD AUTO: 46 % (ref 43–75)
NRBC BLD AUTO-RTO: 0 /100 WBCS
PLATELET # BLD AUTO: 316 THOUSANDS/UL (ref 149–390)
PMV BLD AUTO: 10.1 FL (ref 8.9–12.7)
POTASSIUM SERPL-SCNC: 3.8 MMOL/L (ref 3.5–5.3)
PROT SERPL-MCNC: 7 G/DL (ref 6.4–8.2)
RBC # BLD AUTO: 4.46 MILLION/UL (ref 3.81–5.12)
SODIUM SERPL-SCNC: 134 MMOL/L (ref 136–145)
TRIGL SERPL-MCNC: 199 MG/DL
WBC # BLD AUTO: 4.84 THOUSAND/UL (ref 4.31–10.16)

## 2021-10-07 PROCEDURE — 80061 LIPID PANEL: CPT

## 2021-10-07 PROCEDURE — 82306 VITAMIN D 25 HYDROXY: CPT

## 2021-10-07 PROCEDURE — 80053 COMPREHEN METABOLIC PANEL: CPT

## 2021-10-07 PROCEDURE — 85025 COMPLETE CBC W/AUTO DIFF WBC: CPT

## 2021-10-07 PROCEDURE — 82248 BILIRUBIN DIRECT: CPT

## 2021-10-07 PROCEDURE — 36415 COLL VENOUS BLD VENIPUNCTURE: CPT

## 2021-10-11 ENCOUNTER — TELEPHONE (OUTPATIENT)
Dept: GASTROENTEROLOGY | Facility: CLINIC | Age: 56
End: 2021-10-11

## 2022-01-19 DIAGNOSIS — I10 ESSENTIAL HYPERTENSION: ICD-10-CM

## 2022-01-19 RX ORDER — LISINOPRIL 20 MG/1
20 TABLET ORAL DAILY
Qty: 90 TABLET | Refills: 0 | Status: SHIPPED | OUTPATIENT
Start: 2022-01-19 | End: 2022-04-19

## 2022-01-27 ENCOUNTER — TELEMEDICINE (OUTPATIENT)
Dept: FAMILY MEDICINE CLINIC | Facility: CLINIC | Age: 57
End: 2022-01-27
Payer: COMMERCIAL

## 2022-01-27 DIAGNOSIS — I10 ESSENTIAL HYPERTENSION: ICD-10-CM

## 2022-01-27 DIAGNOSIS — F17.200 TOBACCO USE DISORDER: ICD-10-CM

## 2022-01-27 DIAGNOSIS — R74.8 ELEVATED LIVER ENZYMES: ICD-10-CM

## 2022-01-27 DIAGNOSIS — Z20.822 EXPOSURE TO COVID-19 VIRUS: ICD-10-CM

## 2022-01-27 DIAGNOSIS — B34.9 VIRAL INFECTION, UNSPECIFIED: Primary | ICD-10-CM

## 2022-01-27 DIAGNOSIS — E55.9 VITAMIN D DEFICIENCY: ICD-10-CM

## 2022-01-27 PROCEDURE — 3725F SCREEN DEPRESSION PERFORMED: CPT | Performed by: INTERNAL MEDICINE

## 2022-01-27 PROCEDURE — U0003 INFECTIOUS AGENT DETECTION BY NUCLEIC ACID (DNA OR RNA); SEVERE ACUTE RESPIRATORY SYNDROME CORONAVIRUS 2 (SARS-COV-2) (CORONAVIRUS DISEASE [COVID-19]), AMPLIFIED PROBE TECHNIQUE, MAKING USE OF HIGH THROUGHPUT TECHNOLOGIES AS DESCRIBED BY CMS-2020-01-R: HCPCS | Performed by: INTERNAL MEDICINE

## 2022-01-27 PROCEDURE — U0005 INFEC AGEN DETEC AMPLI PROBE: HCPCS | Performed by: INTERNAL MEDICINE

## 2022-01-27 PROCEDURE — 99213 OFFICE O/P EST LOW 20 MIN: CPT | Performed by: INTERNAL MEDICINE

## 2022-01-27 PROCEDURE — 4004F PT TOBACCO SCREEN RCVD TLK: CPT | Performed by: INTERNAL MEDICINE

## 2022-01-27 NOTE — PROGRESS NOTES
COVID-19 Outpatient Progress Note    Assessment/Plan:    Problem List Items Addressed This Visit        Cardiovascular and Mediastinum    Essential hypertension    Relevant Orders    Comprehensive metabolic panel       Other    Tobacco use disorder    Vitamin D deficiency    Relevant Orders    Vitamin D 25 hydroxy    Elevated liver enzymes    Relevant Orders    Comprehensive metabolic panel      Other Visit Diagnoses     Viral infection, unspecified    -  Primary    Relevant Orders    COVID Only - Office Collect    Exposure to COVID-19 virus        Relevant Orders    COVID Only - Office Collect         Disposition:     Recommended patient to come to the office to test for COVID-19  I have spent 10 minutes directly with the patient  Greater than 50% of this time was spent in counseling/coordination of care regarding: instructions for management and impressions  Encounter provider Mabel Whitehead MD    Provider located at [address] [place] [id number] [phone]    Recent Visits  No visits were found meeting these conditions  Showing recent visits within past 7 days and meeting all other requirements  Today's Visits  Date Type Provider Dept   01/27/22 [facility]   Showing today's visits and meeting all other requirements  Future Appointments  No visits were found meeting these conditions  Showing future appointments within next 150 days and meeting all other requirements     [facility] and patient was informed that this is a secure, HIPAA-compliant platform  She agrees to proceed  Patient agrees to participate in a virtual check in via telephone or video visit instead of presenting to the office to address urgent/immediate medical needs  Patient is aware this is a billable service      After connecting through [facility], the patient was identified by name and date of birth  Carole Councilman was informed that this was a telemedicine visit and that the exam was being conducted confidentially over secure lines  My office door was closed  No one else was in the room  Carole Councilman acknowledged consent and understanding of privacy and security of the telemedicine visit  I informed the patient that I have reviewed her record in Epic and presented the opportunity for her to ask any questions regarding the visit today  The patient agreed to participate  Verification of patient location:  Patient is located in the following state in which I hold an active license: [place]    Subjective:   Carole Councilman is a 64 y o  female who is concerned about COVID-19  Patient's symptoms include nasal congestion, rhinorrhea, sore throat, anosmia, loss of taste and cough  Patient denies fever, chills and shortness of breath       - Date of symptom onset: 1/24/2022      COVID-19 vaccination status: Fully vaccinated (primary series)    Exposure:   Contact with a person who is under investigation (PUI) for or who is positive for COVID-19 within the last 14 days?: Yes    Hospitalized recently for fever and/or lower respiratory symptoms?: No      Currently a healthcare worker that is involved in direct patient care?: No      Works in a special setting where the risk of COVID-19 transmission may be high? (this may include long-term care, correctional and assisted facilities; homeless shelters; assisted-living facilities and group homes ): No      Resident in a special setting where the risk of COVID-19 transmission may be high? (this may include long-term care, correctional and assisted facilities; homeless shelters; assisted-living facilities and group homes ): No      No results found for: [address] [facility], [address] [facility], [address] [facility], [address] [facility], [facility]  Past Medical History:   Diagnosis Date    Anxiety     Colon polyp     Hypertension      Past Surgical History:   Procedure Laterality Date    BREAST CYST EXCISION Right 30yrs     benign    EYE SURGERY      TUBAL LIGATION       Current Outpatient Medications   Medication Sig Dispense Refill    lisinopril (ZESTRIL) 20 mg tablet Take 1 tablet (20 mg total) by mouth daily 90 tablet 0     No current facility-administered medications for this visit  No Known Allergies    Review of Systems   Constitutional: Negative for chills and fever  HENT: Positive for congestion, rhinorrhea and sore throat  Respiratory: Positive for cough  Negative for shortness of breath  Cardiovascular: Negative for chest pain  Objective: There were no vitals filed for this visit  Physical Exam  Vitals reviewed  Constitutional:       General: She is not in acute distress  Appearance: She is normal weight  Pulmonary:      Effort: No respiratory distress  Neurological:      Mental Status: She is alert  Psychiatric:         Mood and Affect: Mood normal          Behavior: Behavior normal          Thought Content: Thought content normal          Judgment: Judgment normal          VIRTUAL VISIT DISCLAIMER    Carole Councilman verbally agrees to participate in [employer]  Pt is aware that Virtual Care Services could be limited without vital signs or the ability to perform a full hands-on physical exam  Mirta Horton understands she or the provider may request at any time to terminate the video visit and request the patient to seek care or treatment in person

## 2022-01-29 LAB — SARS-COV-2 RNA RESP QL NAA+PROBE: NEGATIVE

## 2022-04-19 DIAGNOSIS — I10 ESSENTIAL HYPERTENSION: ICD-10-CM

## 2022-04-19 RX ORDER — LISINOPRIL 20 MG/1
TABLET ORAL
Qty: 90 TABLET | Refills: 0 | Status: SHIPPED | OUTPATIENT
Start: 2022-04-19 | End: 2022-07-07 | Stop reason: SDUPTHER

## 2022-04-19 NOTE — TELEPHONE ENCOUNTER
Pt will need appointment in June for annual physical  Please have her get blood work before her appointment

## 2022-07-06 PROBLEM — N30.01 ACUTE CYSTITIS WITH HEMATURIA: Status: RESOLVED | Noted: 2020-08-05 | Resolved: 2022-07-06

## 2022-07-07 ENCOUNTER — OFFICE VISIT (OUTPATIENT)
Dept: FAMILY MEDICINE CLINIC | Facility: CLINIC | Age: 57
End: 2022-07-07
Payer: COMMERCIAL

## 2022-07-07 VITALS
TEMPERATURE: 97 F | BODY MASS INDEX: 25.89 KG/M2 | DIASTOLIC BLOOD PRESSURE: 86 MMHG | RESPIRATION RATE: 18 BRPM | HEART RATE: 90 BPM | WEIGHT: 174.8 LBS | OXYGEN SATURATION: 99 % | SYSTOLIC BLOOD PRESSURE: 132 MMHG | HEIGHT: 69 IN

## 2022-07-07 DIAGNOSIS — E55.9 VITAMIN D DEFICIENCY: ICD-10-CM

## 2022-07-07 DIAGNOSIS — Z12.2 ENCOUNTER FOR SCREENING FOR LUNG CANCER: ICD-10-CM

## 2022-07-07 DIAGNOSIS — E78.2 MIXED HYPERLIPIDEMIA: ICD-10-CM

## 2022-07-07 DIAGNOSIS — Z00.00 ANNUAL PHYSICAL EXAM: Primary | ICD-10-CM

## 2022-07-07 DIAGNOSIS — Z12.31 ENCOUNTER FOR SCREENING MAMMOGRAM FOR MALIGNANT NEOPLASM OF BREAST: ICD-10-CM

## 2022-07-07 DIAGNOSIS — F17.200 TOBACCO USE DISORDER: ICD-10-CM

## 2022-07-07 DIAGNOSIS — R74.8 ELEVATED LIVER ENZYMES: ICD-10-CM

## 2022-07-07 DIAGNOSIS — R87.613 HSIL (HIGH GRADE SQUAMOUS INTRAEPITHELIAL LESION) ON PAP SMEAR OF CERVIX: ICD-10-CM

## 2022-07-07 DIAGNOSIS — I10 ESSENTIAL HYPERTENSION: ICD-10-CM

## 2022-07-07 PROCEDURE — 3725F SCREEN DEPRESSION PERFORMED: CPT | Performed by: INTERNAL MEDICINE

## 2022-07-07 PROCEDURE — 99396 PREV VISIT EST AGE 40-64: CPT | Performed by: INTERNAL MEDICINE

## 2022-07-07 PROCEDURE — 99214 OFFICE O/P EST MOD 30 MIN: CPT | Performed by: INTERNAL MEDICINE

## 2022-07-07 RX ORDER — LISINOPRIL 20 MG/1
20 TABLET ORAL DAILY
Qty: 90 TABLET | Refills: 3 | Status: SHIPPED | OUTPATIENT
Start: 2022-07-07

## 2022-07-07 NOTE — PROGRESS NOTES
[facility] Primary Care        NAME: Josh Green is a 64 y o  female  : 1965    MRN: [id number]  DATE: 2022  TIME: 10:32 AM    Assessment and Plan   1  Essential hypertension  -BP is a little elevated today, just had cigarettes and coffee however  -    lisinopril (ZESTRIL) 20 mg tablet; Take 1 tablet (20 mg total) by mouth daily  -     CBC and differential; Future  -     Lipid Panel with Direct LDL reflex; Future  -     Comprehensive metabolic panel; Future    2  Encounter for screening mammogram for malignant neoplasm of breast  -     Mammo screening bilateral w 3d & cad; Future; Expected date: 2022    3  Tobacco use disorder  -     CT lung screening program; Future; Expected date: 2022    4  Elevated liver enzymes  -     Comprehensive metabolic panel; Future  -     US abdomen complete; Future; Expected date: 2022    5  Vitamin D deficiency  -     Vitamin D 25 hydroxy; Future    6  HSIL (high grade squamous intraepithelial lesion) on Pap smear of cervix  -HSIL on pap 2020, didn't follow up with Gyn at that time  Reports history of HPV but no colpo  Will refer to Gyn at this time for repeat pap and/or colpo   -    Ambulatory Referral to Gynecology; Future    7  Annual physical exam    8  Encounter for screening for lung cancer  -     CT lung screening program; Future; Expected date: 2022    9  Mixed hyperlipidemia  -     Lipid Panel with Direct LDL reflex; Future             Chief Complaint     Chief Complaint   Patient presents with    Annual Exam         History of Present Illness       64yo female with HTN, tobacco abuse here for follow up/annual physical  Doing well, no concerns today  HTN: taking lisinopril 20mg, denies dizziness, chest pain  Did have cigarette and coffee prior to today's visit  Tobacco abuse: still smoking about 1-2 ppd  Not interested in quitting right now   Didn't get CT lung cancer screening yet due to high co-pay with her insurance  She is agreeable if cost isn't too prohibitive  Review of Systems   Review of Systems   Constitutional: Negative for chills, fatigue, fever and unexpected weight change  HENT: Negative for congestion, postnasal drip, rhinorrhea, sore throat, trouble swallowing and voice change  Eyes: Negative for pain, redness, itching and visual disturbance  Respiratory: Negative for cough, chest tightness, shortness of breath and wheezing  Cardiovascular: Negative for chest pain, palpitations and leg swelling  Gastrointestinal: Negative for abdominal pain, blood in stool, constipation, diarrhea and nausea  Endocrine: Negative for cold intolerance, heat intolerance, polydipsia and polyuria  Genitourinary: Negative for dysuria, frequency, hematuria and urgency  Musculoskeletal: Positive for arthralgias  Negative for back pain and joint swelling  Skin: Negative for rash  Neurological: Negative for dizziness, tremors, weakness, light-headedness, numbness and headaches  Psychiatric/Behavioral: Negative for confusion, dysphoric mood, hallucinations, sleep disturbance and suicidal ideas  The patient is not nervous/anxious            Current Medications       Current Outpatient Medications:     lisinopril (ZESTRIL) 20 mg tablet, Take 1 tablet (20 mg total) by mouth daily, Disp: 90 tablet, Rfl: 3    Current Allergies     Allergies as of 07/07/2022    (No Known Allergies)            The following portions of the patient's history were reviewed and updated as appropriate: allergies, current medications, past family history, past medical history, past social history, past surgical history and problem list      Past Medical History:   Diagnosis Date    Anxiety     Colon polyp     Hypertension        Past Surgical History:   Procedure Laterality Date    BREAST CYST EXCISION Right 30yrs     benign    EYE SURGERY      TUBAL LIGATION         Family History   Problem Relation Age of Onset    Heart disease Mother     Hypertension Mother     Cancer Mother     Heart disease Father     Hypertension Father     Aneurysm Father     No Known Problems Sister     No Known Problems Daughter     No Known Problems Maternal Grandmother     No Known Problems Paternal Grandmother     No Known Problems Sister     No Known Problems Sister     No Known Problems Daughter     No Known Problems Maternal Aunt     No Known Problems Maternal Aunt     No Known Problems Paternal Aunt          Medications have been verified  Objective   /86   Pulse 90   Temp (!) 97 °F (36 1 °C)   Resp 18   Ht 5' 9" (1 753 m)   Wt 79 3 kg (174 lb 12 8 oz)   SpO2 99%   BMI 25 81 kg/m²        Physical Exam     Physical Exam  Vitals reviewed  Constitutional:       General: She is not in acute distress  Appearance: She is well-developed  HENT:      Head: Normocephalic and atraumatic  Right Ear: Tympanic membrane, ear canal and external ear normal       Left Ear: Tympanic membrane, ear canal and external ear normal       Mouth/Throat:      Pharynx: No oropharyngeal exudate or posterior oropharyngeal erythema  Eyes:      General: No scleral icterus  Conjunctiva/sclera: Conjunctivae normal    Neck:      Thyroid: No thyromegaly  Vascular: No JVD  Cardiovascular:      Rate and Rhythm: Normal rate and regular rhythm  Heart sounds: Normal heart sounds  No murmur heard  No friction rub  No gallop  Pulmonary:      Effort: Pulmonary effort is normal  No respiratory distress  Breath sounds: Normal breath sounds  No wheezing, rhonchi or rales  Chest:      Chest wall: No tenderness  Abdominal:      General: Abdomen is flat  Bowel sounds are normal  There is no distension  Palpations: Abdomen is soft  There is no mass  Tenderness: There is no abdominal tenderness  There is no guarding or rebound  Hernia: No hernia is present     Musculoskeletal:         General: No deformity  Normal range of motion  Cervical back: Normal range of motion and neck supple  Lymphadenopathy:      Cervical: No cervical adenopathy  Skin:     General: Skin is warm and dry  Capillary Refill: Capillary refill takes less than 2 seconds  Neurological:      General: No focal deficit present  Mental Status: She is alert and oriented to person, place, and time  Psychiatric:         Mood and Affect: Mood normal          Behavior: Behavior normal          Thought Content: Thought content normal          Judgment: Judgment normal              Results:  Lab Results   Component Value Date    SODIUM 134 (L) 10/07/2021    K 3 8 10/07/2021     10/07/2021    CO2 24 10/07/2021    BUN 5 10/07/2021    CREATININE 0 55 (L) 10/07/2021    CALCIUM 8 9 10/07/2021       Lab Results   Component Value Date    HGBA1C 5 3 07/16/2020       Lab Results   Component Value Date    WBC 4 84 10/07/2021    HGB 14 9 10/07/2021    HCT 43 2 10/07/2021    MCV 97 10/07/2021     10/07/2021     US Abdomen 09/10/20  FINDINGS:     PANCREAS:  Visualized portions of the pancreas are within normal limits      AORTA AND IVC:  Visualized portions are normal for patient age      LIVER:  Size:  The liver measures 16 5 cm in the midclavicular line  Contour:  Surface contour is smooth  Parenchyma:  Echogenicity is diffusely mildly increased with smooth echotexture     No evidence of suspicious mass  Limited imaging of the main portal vein shows it to be patent and hepatopetal      BILIARY:  Gallbladder neck 3 mm nonmobile nonshadowing echogenic focus  No intrahepatic biliary dilatation  CBD measures 5 mm  No choledocholithiasis  Negative Gilbert's sign     KIDNEY:   Right kidney measures 11 1 x 5 5 cm  Within normal limits      Left kidney measures 9 6 x 5 3 cm  Within normal limits      SPLEEN:   Measures 8 3 x 8 3 x 3 0 cm    Within normal limits      ASCITES:  None      IMPRESSION:     Borderline hepatomegaly      Mild hepatic fatty infiltration      Subcentimeter gallbladder polyp

## 2022-07-07 NOTE — PATIENT INSTRUCTIONS

## 2022-07-07 NOTE — PROGRESS NOTES
[facility]    NAME: Jaison Marin  AGE: 64 y o  SEX: female  : 1965     DATE: 2022     Assessment and Plan:     Problem List Items Addressed This Visit        Cardiovascular and Mediastinum    Essential hypertension - Primary    Relevant Medications    lisinopril (ZESTRIL) 20 mg tablet    Other Relevant Orders    CBC and differential    Lipid Panel with Direct LDL reflex    Comprehensive metabolic panel       Other    Tobacco use disorder    Relevant Orders    CT lung screening program    Vitamin D deficiency    Relevant Orders    Vitamin D 25 hydroxy    Elevated liver enzymes    Relevant Orders    Comprehensive metabolic panel    US abdomen complete    HSIL (high grade squamous intraepithelial lesion) on Pap smear of cervix    Relevant Orders    Ambulatory Referral to Gynecology      Other Visit Diagnoses     Encounter for screening mammogram for malignant neoplasm of breast        Relevant Orders    Mammo screening bilateral w 3d & cad    Annual physical exam        Encounter for screening for lung cancer        Relevant Orders    CT lung screening program    Mixed hyperlipidemia        Relevant Orders    Lipid Panel with Direct LDL reflex          Immunizations and preventive care screenings were discussed with patient today  Appropriate education was printed on patient's after visit summary  Counseling:  Alcohol/drug use: discussed moderation in alcohol intake, the recommendations for healthy alcohol use, and avoidance of illicit drug use  Dental Health: discussed importance of regular tooth brushing, flossing, and dental visits  Injury prevention: discussed safety/seat belts, safety helmets, smoke detectors, carbon dioxide detectors, and smoking near bedding or upholstery    Sexual health: discussed sexually transmitted diseases, partner selection, use of condoms, avoidance of unintended pregnancy, and contraceptive alternatives  Exercise: the importance of regular exercise/physical activity was discussed  Recommend exercise 3-5 times per week for at least 30 minutes  BMI Counseling: Body mass index is 25 81 kg/m²  The BMI is above normal  Nutrition recommendations include decreasing portion sizes, encouraging healthy choices of fruits and vegetables and limiting drinks that contain sugar  Exercise recommendations include moderate physical activity 150 minutes/week  Rationale for BMI follow-up plan is due to patient being overweight or obese  Depression Screening and Follow-up Plan: Patient was screened for depression during today's encounter  They screened negative with a PHQ-2 score of 0  Tobacco Cessation Counseling: The patient is sincerely urged to quit consumption of tobacco  She is not ready to quit tobacco        Return in about 6 months (around 1/7/2023) for Recheck  Chief Complaint:     Chief Complaint   Patient presents with    Annual Exam      History of Present Illness:     Adult Annual Physical   Patient here for a comprehensive physical exam  The patient reports problems - refer to separate note  Diet and Physical Activity  Diet/Nutrition: well balanced diet  Exercise: no formal exercise  Depression Screening  PHQ-2/9 Depression Screening    Little interest or pleasure in doing things: 0 - not at all  Feeling down, depressed, or hopeless: 0 - not at all  PHQ-2 Score: 0  PHQ-2 Interpretation: Negative depression screen       General Health  Sleep: sleeps well  Hearing: normal - bilateral   Vision: goes for regular eye exams  Dental: regular dental visits and has dentures         /GYN Health  Patient is: postmenopausal  Last menstrual period: NA  Contraceptive method: NA      Review of Systems:     Review of Systems   Past Medical History:     Past Medical History:   Diagnosis Date    Anxiety     Colon polyp     Hypertension       Past Surgical History:     Past Surgical History:   Procedure Laterality Date    BREAST CYST EXCISION Right 30yrs     benign    EYE SURGERY      TUBAL LIGATION        Social History:     Social History     Socioeconomic History    Marital status: Single     Spouse name: None    Number of children: None    Years of education: None    Highest education level: None   Occupational History    None   Tobacco Use    Smoking status: Current Every Day Smoker     Packs/day: 2 00     Years: 45 00     Pack years: 90 00     Types: Cigarettes    Smokeless tobacco: Never Used   Vaping Use    Vaping Use: Never used   Substance and Sexual Activity    Alcohol use: Yes     Comment: 3 beer/day    Drug use: No    Sexual activity: None   Other Topics Concern    None   Social History Narrative    None     Social Determinants of Health     Financial Resource Strain: Not on file   Food Insecurity: Not on file   Transportation Needs: Not on file   Physical Activity: Not on file   Stress: Not on file   Social Connections: Not on file   Intimate Partner Violence: Not on file   Housing Stability: Not on file      Family History:     Family History   Problem Relation Age of Onset    Heart disease Mother     Hypertension Mother     Cancer Mother     Heart disease Father     Hypertension Father     Aneurysm Father     No Known Problems Sister     No Known Problems Daughter     No Known Problems Maternal Grandmother     No Known Problems Paternal Grandmother     No Known Problems Sister     No Known Problems Sister     No Known Problems Daughter     No Known Problems Maternal Aunt     No Known Problems Maternal Aunt     No Known Problems Paternal Aunt       Current Medications:     Current Outpatient Medications   Medication Sig Dispense Refill    lisinopril (ZESTRIL) 20 mg tablet Take 1 tablet (20 mg total) by mouth daily 90 tablet 3     No current facility-administered medications for this visit        Allergies:     No Known Allergies   Physical Exam:     /86   Pulse 90   Temp (!) 97 °F (36 1 °C)   Resp 18   Ht 5' 9" (1 753 m)   Wt 79 3 kg (174 lb 12 8 oz)   SpO2 99%   BMI 25 81 kg/m²     Physical Exam     Vickie Barahona MD  [id number]

## 2022-07-13 ENCOUNTER — TELEPHONE (OUTPATIENT)
Dept: OBGYN CLINIC | Facility: CLINIC | Age: 57
End: 2022-07-13

## 2022-07-13 NOTE — TELEPHONE ENCOUNTER
Lm for pt to contact me so we can schedule an appt with Angelika in [facility] for f/u or her abnormal pap

## 2022-11-03 ENCOUNTER — TELEPHONE (OUTPATIENT)
Dept: FAMILY MEDICINE CLINIC | Facility: CLINIC | Age: 57
End: 2022-11-03

## 2022-11-03 NOTE — TELEPHONE ENCOUNTER
Left message for patient requesting call back  Her Mammo was ordered  If she calls back, need to offer to assist in scheduling

## 2023-01-05 ENCOUNTER — OFFICE VISIT (OUTPATIENT)
Dept: FAMILY MEDICINE CLINIC | Facility: CLINIC | Age: 58
End: 2023-01-05

## 2023-01-05 ENCOUNTER — APPOINTMENT (OUTPATIENT)
Dept: LAB | Facility: CLINIC | Age: 58
End: 2023-01-05

## 2023-01-05 VITALS
WEIGHT: 173 LBS | DIASTOLIC BLOOD PRESSURE: 112 MMHG | SYSTOLIC BLOOD PRESSURE: 166 MMHG | HEIGHT: 69 IN | RESPIRATION RATE: 18 BRPM | HEART RATE: 108 BPM | BODY MASS INDEX: 25.62 KG/M2 | OXYGEN SATURATION: 99 % | TEMPERATURE: 97.2 F

## 2023-01-05 DIAGNOSIS — Z23 ENCOUNTER FOR IMMUNIZATION: ICD-10-CM

## 2023-01-05 DIAGNOSIS — I10 ESSENTIAL HYPERTENSION: Primary | ICD-10-CM

## 2023-01-05 DIAGNOSIS — R74.8 ELEVATED LIVER ENZYMES: ICD-10-CM

## 2023-01-05 DIAGNOSIS — I10 ESSENTIAL HYPERTENSION: ICD-10-CM

## 2023-01-05 DIAGNOSIS — E78.2 MIXED HYPERLIPIDEMIA: ICD-10-CM

## 2023-01-05 DIAGNOSIS — F17.200 TOBACCO USE DISORDER: ICD-10-CM

## 2023-01-05 DIAGNOSIS — E55.9 VITAMIN D DEFICIENCY: ICD-10-CM

## 2023-01-05 LAB
25(OH)D3 SERPL-MCNC: 30.8 NG/ML (ref 30–100)
ALBUMIN SERPL BCP-MCNC: 3.6 G/DL (ref 3.5–5)
ALP SERPL-CCNC: 73 U/L (ref 46–116)
ALT SERPL W P-5'-P-CCNC: 32 U/L (ref 12–78)
ANION GAP SERPL CALCULATED.3IONS-SCNC: 4 MMOL/L (ref 4–13)
AST SERPL W P-5'-P-CCNC: 29 U/L (ref 5–45)
BASOPHILS # BLD AUTO: 0.06 THOUSANDS/ÂΜL (ref 0–0.1)
BASOPHILS NFR BLD AUTO: 1 % (ref 0–1)
BILIRUB SERPL-MCNC: 0.28 MG/DL (ref 0.2–1)
BUN SERPL-MCNC: 17 MG/DL (ref 5–25)
CALCIUM SERPL-MCNC: 10.1 MG/DL (ref 8.3–10.1)
CHLORIDE SERPL-SCNC: 104 MMOL/L (ref 96–108)
CHOLEST SERPL-MCNC: 243 MG/DL
CO2 SERPL-SCNC: 28 MMOL/L (ref 21–32)
CREAT SERPL-MCNC: 0.74 MG/DL (ref 0.6–1.3)
EOSINOPHIL # BLD AUTO: 0.24 THOUSAND/ÂΜL (ref 0–0.61)
EOSINOPHIL NFR BLD AUTO: 4 % (ref 0–6)
ERYTHROCYTE [DISTWIDTH] IN BLOOD BY AUTOMATED COUNT: 11.7 % (ref 11.6–15.1)
GFR SERPL CREATININE-BSD FRML MDRD: 90 ML/MIN/1.73SQ M
GLUCOSE P FAST SERPL-MCNC: 88 MG/DL (ref 65–99)
HCT VFR BLD AUTO: 42.6 % (ref 34.8–46.1)
HDLC SERPL-MCNC: 62 MG/DL
HGB BLD-MCNC: 14.1 G/DL (ref 11.5–15.4)
IMM GRANULOCYTES # BLD AUTO: 0.02 THOUSAND/UL (ref 0–0.2)
IMM GRANULOCYTES NFR BLD AUTO: 0 % (ref 0–2)
LDLC SERPL CALC-MCNC: 165 MG/DL (ref 0–100)
LYMPHOCYTES # BLD AUTO: 2.08 THOUSANDS/ÂΜL (ref 0.6–4.47)
LYMPHOCYTES NFR BLD AUTO: 30 % (ref 14–44)
MCH RBC QN AUTO: 32.7 PG (ref 26.8–34.3)
MCHC RBC AUTO-ENTMCNC: 33.1 G/DL (ref 31.4–37.4)
MCV RBC AUTO: 99 FL (ref 82–98)
MONOCYTES # BLD AUTO: 0.79 THOUSAND/ÂΜL (ref 0.17–1.22)
MONOCYTES NFR BLD AUTO: 12 % (ref 4–12)
NEUTROPHILS # BLD AUTO: 3.68 THOUSANDS/ÂΜL (ref 1.85–7.62)
NEUTS SEG NFR BLD AUTO: 53 % (ref 43–75)
NRBC BLD AUTO-RTO: 0 /100 WBCS
PLATELET # BLD AUTO: 280 THOUSANDS/UL (ref 149–390)
PMV BLD AUTO: 10.6 FL (ref 8.9–12.7)
POTASSIUM SERPL-SCNC: 4.5 MMOL/L (ref 3.5–5.3)
PROT SERPL-MCNC: 7.2 G/DL (ref 6.4–8.4)
RBC # BLD AUTO: 4.31 MILLION/UL (ref 3.81–5.12)
SODIUM SERPL-SCNC: 136 MMOL/L (ref 135–147)
TRIGL SERPL-MCNC: 82 MG/DL
WBC # BLD AUTO: 6.87 THOUSAND/UL (ref 4.31–10.16)

## 2023-01-05 NOTE — PATIENT INSTRUCTIONS
Please get blood work done today  Have mammogram and US abdomen done before follow up  Return in 1 month for BP check, 6 months for annual physical

## 2023-01-05 NOTE — PROGRESS NOTES
[facility]        NAME: Favian Santos is a 62 y o  female  : 1965    MRN: [id number]  DATE: 2023  TIME: 9:00 PM    Assessment and Plan   1  Essential hypertension  - BP uncontrolled currently but pt reports she just took her medication and had a cigarette prior to this appointment  She will get labs done and follow up in 1 month for BP check  2  Tobacco use disorder  - not interested in quitting right now, cannot afford co-pay for lung cancer screening    3  Elevated liver enzymes  - she is overdue for blood work and abdominal US    4  Vitamin D deficiency  - she will get blood work done     5  Encounter for immunization  -     Zoster Vaccine Recombinant IM  -     Age 15 y+, BOOSTER (BIVALENT): [id number] [place] vac bivalent [id number]             Chief Complaint     Chief Complaint   Patient presents with   • Follow-up         History of Present Illness       60yo female with HTN, tobacco abuse here for 6 month follow up  Doing well  HTN: taking lisinopril but just took it before her visit, also had cigarette prior to this visit  Denies headache, dizziness, chest pain or dyspnea    Tobacco abuse: still smoking about 1 ppd, not interested in quitting  Unable to get lung cancer screening due to large co-pay (> $100)    Hasn't done labs, mammogram or US yet  Review of Systems   Review of Systems   Constitutional: Negative for appetite change, chills, fatigue and fever  Respiratory: Negative for cough, chest tightness and shortness of breath  Cardiovascular: Negative for chest pain, palpitations and leg swelling  Gastrointestinal: Negative for abdominal pain, diarrhea, nausea and vomiting  Neurological: Negative for dizziness, light-headedness and headaches           Current Medications       Current Outpatient Medications:   •  lisinopril (ZESTRIL) 20 mg tablet, Take 1 tablet (20 mg total) by mouth daily, Disp: 90 tablet, Rfl: 3    Current Allergies Allergies as of 01/05/2023   • (No Known Allergies)            The following portions of the patient's history were reviewed and updated as appropriate: allergies, current medications, past family history, past medical history, past social history, past surgical history and problem list      Past Medical History:   Diagnosis Date   • Anxiety    • Colon polyp    • Hypertension        Past Surgical History:   Procedure Laterality Date   • BREAST CYST EXCISION Right 30yrs     benign   • EYE SURGERY     • TUBAL LIGATION         Family History   Problem Relation Age of Onset   • Heart disease Mother    • Hypertension Mother    • Cancer Mother    • Heart disease Father    • Hypertension Father    • Aneurysm Father    • No Known Problems Sister    • No Known Problems Daughter    • No Known Problems Maternal Grandmother    • No Known Problems Paternal Grandmother    • No Known Problems Sister    • No Known Problems Sister    • No Known Problems Daughter    • No Known Problems Maternal Aunt    • No Known Problems Maternal Aunt    • No Known Problems Paternal Aunt          Medications have been verified  Objective   BP (!) 166/112   Pulse (!) 108   Temp (!) 97 2 °F (36 2 °C)   Resp 18   Ht 5' 9" (1 753 m)   Wt 78 5 kg (173 lb)   SpO2 99%   BMI 25 55 kg/m²        Physical Exam     Physical Exam  Vitals reviewed  Constitutional:       General: She is not in acute distress  Appearance: She is normal weight  Cardiovascular:      Rate and Rhythm: Regular rhythm  Tachycardia present  Heart sounds: No murmur heard  No friction rub  No gallop  Pulmonary:      Effort: Pulmonary effort is normal  No respiratory distress  Breath sounds: No wheezing, rhonchi or rales  Abdominal:      General: Abdomen is flat  Bowel sounds are normal  There is no distension  Palpations: Abdomen is soft  There is no mass  Tenderness: There is no abdominal tenderness  There is no guarding or rebound  Hernia: No hernia is present  Neurological:      General: No focal deficit present  Mental Status: She is alert  Psychiatric:         Mood and Affect: Mood normal          Behavior: Behavior normal          Thought Content:  Thought content normal          Judgment: Judgment normal              Results:  Lab Results   Component Value Date    SODIUM 134 (L) 10/07/2021    K 3 8 10/07/2021     10/07/2021    CO2 24 10/07/2021    BUN 5 10/07/2021    CREATININE 0 55 (L) 10/07/2021    CALCIUM 8 9 10/07/2021       Lab Results   Component Value Date    HGBA1C 5 3 07/16/2020       Lab Results   Component Value Date    WBC 6 87 01/05/2023    HGB 14 1 01/05/2023    HCT 42 6 01/05/2023    MCV 99 (H) 01/05/2023     01/05/2023

## 2023-01-09 DIAGNOSIS — E78.2 MIXED HYPERLIPIDEMIA: Primary | ICD-10-CM

## 2023-01-09 RX ORDER — ATORVASTATIN CALCIUM 20 MG/1
20 TABLET, FILM COATED ORAL
Qty: 90 TABLET | Refills: 1 | Status: SHIPPED | OUTPATIENT
Start: 2023-01-09

## 2023-02-09 ENCOUNTER — OFFICE VISIT (OUTPATIENT)
Dept: FAMILY MEDICINE CLINIC | Facility: CLINIC | Age: 58
End: 2023-02-09

## 2023-02-09 VITALS
WEIGHT: 176.8 LBS | HEIGHT: 69 IN | SYSTOLIC BLOOD PRESSURE: 164 MMHG | RESPIRATION RATE: 18 BRPM | BODY MASS INDEX: 26.19 KG/M2 | OXYGEN SATURATION: 100 % | TEMPERATURE: 98.1 F | HEART RATE: 88 BPM | DIASTOLIC BLOOD PRESSURE: 92 MMHG

## 2023-02-09 DIAGNOSIS — E55.9 VITAMIN D DEFICIENCY: ICD-10-CM

## 2023-02-09 DIAGNOSIS — Z23 ENCOUNTER FOR IMMUNIZATION: ICD-10-CM

## 2023-02-09 DIAGNOSIS — R74.8 ELEVATED LIVER ENZYMES: ICD-10-CM

## 2023-02-09 DIAGNOSIS — I10 ESSENTIAL HYPERTENSION: Primary | ICD-10-CM

## 2023-02-09 RX ORDER — LISINOPRIL 40 MG/1
40 TABLET ORAL DAILY
Qty: 90 TABLET | Refills: 3
Start: 2023-02-09

## 2023-02-09 NOTE — PROGRESS NOTES
[facility]        NAME: Mahesh De Dios is a 62 y o  female  : 1965    MRN: [id number]  DATE: 2023  TIME: 2:10 PM    Assessment and Plan   1  Essential hypertension  - Increase lisinopril to 40mg, check BMP in 1 week, follow up in 1 month for nurse visit if BP > 130/80, will add HCTZ     -  lisinopril (ZESTRIL) 40 mg tablet; Take 1 tablet (40 mg total) by mouth daily  -     Basic metabolic panel; Future; Expected date: 2023    2  Elevated liver enzymes  - improved on recent labs    3  Vitamin D deficiency  - at goal, she is taking D3     4  Encounter for immunization  -     Zoster Vaccine Recombinant IM             Chief Complaint     Chief Complaint   Patient presents with   • Blood Pressure Check     Wants 2nd shingles         History of Present Illness       60yo female with HTN, HLD here for follow up/BP check  She states that she took her lisinopril this morning around 2am  Did have a cigarette when she woke up  Denies headaches, dizziness or chest pain  Does not add salt to food, limited processed foods  Review of Systems   Review of Systems   Constitutional: Negative for appetite change, chills, fatigue and fever  Respiratory: Negative for chest tightness and shortness of breath  Cardiovascular: Negative for chest pain and leg swelling  Neurological: Negative for dizziness, light-headedness and headaches           Current Medications       Current Outpatient Medications:   •  atorvastatin (LIPITOR) 20 mg tablet, Take 1 tablet (20 mg total) by mouth daily at bedtime, Disp: 90 tablet, Rfl: 1  •  lisinopril (ZESTRIL) 40 mg tablet, Take 1 tablet (40 mg total) by mouth daily, Disp: 90 tablet, Rfl: 3    Current Allergies     Allergies as of 2023   • (No Known Allergies)            The following portions of the patient's history were reviewed and updated as appropriate: allergies, current medications, past family history, past medical history, past social history, past surgical history and problem list      Past Medical History:   Diagnosis Date   • Anxiety    • Colon polyp    • Hypertension        Past Surgical History:   Procedure Laterality Date   • BREAST CYST EXCISION Right 30yrs     benign   • EYE SURGERY     • TUBAL LIGATION         Family History   Problem Relation Age of Onset   • Heart disease Mother    • Hypertension Mother    • Cancer Mother    • Heart disease Father    • Hypertension Father    • Aneurysm Father    • No Known Problems Sister    • No Known Problems Daughter    • No Known Problems Maternal Grandmother    • No Known Problems Paternal Grandmother    • No Known Problems Sister    • No Known Problems Sister    • No Known Problems Daughter    • No Known Problems Maternal Aunt    • No Known Problems Maternal Aunt    • No Known Problems Paternal Aunt          Medications have been verified  Objective   /92   Pulse 88   Temp 98 1 °F (36 7 °C)   Resp 18   Ht 5' 9" (1 753 m)   Wt 80 2 kg (176 lb 12 8 oz)   SpO2 100%   BMI 26 11 kg/m²        Physical Exam     Physical Exam  Vitals reviewed  Constitutional:       General: She is not in acute distress  Appearance: She is normal weight  Cardiovascular:      Rate and Rhythm: Normal rate and regular rhythm  Heart sounds: No murmur heard  No friction rub  No gallop  Pulmonary:      Effort: Pulmonary effort is normal  No respiratory distress  Breath sounds: No wheezing, rhonchi or rales  Musculoskeletal:      Right lower leg: No edema  Left lower leg: No edema  Neurological:      General: No focal deficit present  Mental Status: She is alert  Psychiatric:         Mood and Affect: Mood normal          Behavior: Behavior normal          Thought Content:  Thought content normal          Judgment: Judgment normal              Results:  Lab Results   Component Value Date    SODIUM 136 01/05/2023    K 4 5 01/05/2023     01/05/2023    CO2 28 01/05/2023    BUN 17 01/05/2023    CREATININE 0 74 01/05/2023    CALCIUM 10 1 01/05/2023       Lab Results   Component Value Date    HGBA1C 5 3 07/16/2020       Lab Results   Component Value Date    WBC 6 87 01/05/2023    HGB 14 1 01/05/2023    HCT 42 6 01/05/2023    MCV 99 (H) 01/05/2023     01/05/2023

## 2023-02-09 NOTE — PATIENT INSTRUCTIONS
Please increase lisinopril to 40mg (2 tabs), get blood in 1 week to check kidneys with dose change  Follow up in 1 month for BP check with nurse      DASH Eating Plan   AMBULATORY CARE:   The DASH (Dietary Approaches to Stop Hypertension) Eating Plan  is designed to help prevent or lower high blood pressure  It can also help to lower LDL (bad) cholesterol and decrease your risk for heart disease  The plan is low in sodium, sugar, unhealthy fats, and total fat  It is high in potassium, calcium, magnesium, and fiber  These nutrients are added when you eat more fruits, vegetables, and whole grains  With the DASH eating plan, you need to eat a certain number of servings from each food group  This will help you get enough of certain nutrients and limit others  The amount of servings you should eat depends on how many calories you need  Your dietitian can help you create meal plans with the right number of servings for each food group  What you need to know about sodium:  Your dietitian will tell you how much sodium is safe for you to have each day  People with high blood pressure should have no more than 1,500 to 2,300 mg of sodium in a day  A teaspoon (tsp) of salt has 2,300 mg of sodium  This may seem like a difficult goal, but small changes to the foods you eat can make a big difference  Your healthcare provider or dietitian can help you create a meal plan that follows your sodium limit  Read food labels  Food labels can help you choose foods that are low in sodium  The amount of sodium is listed in milligrams (mg)  The % Daily Value (DV) column tells you how much of your daily needs are met by 1 serving of the food for each nutrient listed  Choose foods that have less than 5% of the DV of sodium  These foods are considered low in sodium  Foods that have 20% or more of the DV of sodium are considered high in sodium  Avoid foods that have more than 300 mg of sodium in each serving   Choose foods that say low-sodium, reduced-sodium, or no salt added on the food label  Limit added salt  Do not salt food at the table if you add salt when you cook  Use herbs and spices, such as onions, garlic, and salt-free seasonings to add flavor  Try lemon or lime juice or vinegar to add a tart flavor  Use hot peppers or a small amount of hot pepper sauce to add a spicy flavor  Limit foods high in added salt, such as the following:    Seasonings made with salt, such as garlic salt, celery salt, onion salt, seasoned salt, meat tenderizers, and monosodium glutamate (MSG)    Miso soup and canned or dried soup mixes    Regular soy sauce, barbecue sauce, teriyaki sauce, steak sauce, Worcestershire sauce, and most flavored vinegars    Snack foods, such as salted chips, popcorn, pretzels, pork rinds, salted crackers, and salted nuts    Frozen foods, such as dinners, entrees, vegetables with sauces, and breaded meats    Ask about salt substitutes  Ask your healthcare provider if you may use salt substitutes  Some salt substitutes have ingredients that can be harmful if you have certain health conditions  Choose foods carefully at restaurants  Meals from restaurants, especially fast food restaurants, are often high in sodium  Some restaurants have nutrition information that tells you the amount of sodium in their foods  Ask to have your food prepared with less, or no salt  What you need to know about fats:  Healthy fats include unsaturated fats and omega-3 fatty acids  Unhealthy fats include saturated fats and trans fats    Include healthy fats, such as the following:      Cooking oils, such as soybean, canola, olive, or sunflower    Fatty fish, such as salmon, tuna, mackerel, or sardines    Flaxseed oil or ground flaxseed    ½ cup of cooked beans, such as black beans, kidney beans, or peralta beans    1½ ounces of low-sodium nuts, such as almonds or walnuts    Low-sugar, low-sodium peanut butter    Seeds such as carmita seeds or sunflower seeds       Limit or do not have unhealthy fats, such as the following:      Foods that contain fat from animals, such as fatty meats, whole milk, butter, and cream    Shortening, stick margarine, palm oil, and coconut oil    Full-fat or creamy salad dressing    Creamy soup    Crackers, chips, and baked goods made with margarine or shortening    Foods that are fried in unhealthy fats    Gravy and sauces, such as Gigi or cheese sauces    What you need to know about carbohydrates (carbs): All carbs break down into sugar  Complex carbs contain more fiber than simple carbs  This means complex carbs go into the bloodstream more slowly and cause less of a blood sugar spike  Try to include more complex carbs and fewer simple carbs  Include complex carbs, such as the followin slice of whole-grain bread    1 ounce of dry cereal that does not contain added sugar    ½ cup of cooked oatmeal    2 ounces of cooked whole-grain pasta    ½ cup of cooked brown rice    Limit or do not have simple carbs, such as the following:      [employer], such as doughnuts, pastries, and cookies    Mixes for cornbread and biscuits    White rice and pasta mixes, such as boxed macaroni and cheese    Instant and cold cereals that contain sugar    Jelly, jam, and ice cream that contain sugar    Condiments such as ketchup    Drinks high in sugar, such as soft drinks, lemonade, and fruit juice    What you need to know about vegetables and fruits:  Vegetables and fruits can be fresh, frozen, or canned  If possible, try to choose low-sodium canned options    Include a variety of vegetables and fruits, such as the followin medium apple, pear, or peach (about ½ cup chopped)    ½ small banana    ½ cup berries, such as blueberries, strawberries, or blackberries    1 cup of raw leafy greens, such as lettuce, spinach, kale, or sam greens    ½ cup of frozen or canned (no added salt) vegetables, such as green beans    ½ cup of fresh, frozen, or canned fruit (canned in light syrup or fruit juice)    ½ cup of vegetable or fruit juice    Limit or do not have vegetables and fruits made in the following ways:      Frozen fruit such as cherries that have added sugar    Fruit in cream or butter sauce    Canned vegetables that are high in sodium    Sauerkraut, pickled vegetables, and other foods prepared in brine    Fried vegetables or vegetables in butter or high-fat sauces    What you need to know about protein foods: Include lean or low-fat protein foods, such as the following:      Poultry (chicken, turkey) with no skin    Fish (especially fatty fish, such as salmon, fresh tuna, or mackerel)    Lean beef and pork (loin, round, extra lean hamburger)    Egg whites and egg substitutes    1 cup of nonfat (skim) or 1% milk    1½ ounces of fat-free or low-fat cheese    6 ounces of nonfat or low-fat yogurt    Limit or do not have high-fat protein foods, such as the following:      Smoked or cured meat, such as corned beef, roland, ham, hot dogs, and sausage    Canned beans and canned meats or spreads, such as potted meats, sardines, anchovies, and imitation seafood    Deli or lunch meats, such as bologna, ham, turkey, and roast beef    High-fat meat (T-bone steak, regular hamburger, and ribs)    Whole eggs and egg yolks    Whole milk, 2% milk, and cream    Regular cheese and processed cheese    Other guidelines to follow:   Maintain a healthy weight  Your risk for heart disease is higher if you are overweight  Your healthcare provider may suggest that you lose weight if you are overweight  You can lose weight by eating fewer calories and foods that have added sugars and fat  The DASH meal plan can help you do this  Decrease calories by eating smaller portions at each meal and fewer snacks  Ask your healthcare provider for more information about how to lose weight  Exercise regularly    Regular exercise can help you reach or maintain a healthy weight  Regular exercise can also help decrease your blood pressure and improve your cholesterol levels  Get 30 minutes or more of moderate exercise each day of the week  To lose weight, get at least 60 minutes of exercise  Talk to your healthcare provider about the best exercise program for you  Limit alcohol  Women should limit alcohol to 1 drink a day  Men should limit alcohol to 2 drinks a day  A drink of alcohol is 12 ounces of beer, 5 ounces of wine, or 1½ ounces of liquor  For more information:   [facility] and [address]  [address] [address]  [place] [address]  Phone: [phone]  Web Address: [facility] no    © [facility] 2022 Information is for End User's use only and may not be sold, redistributed or otherwise used for commercial purposes  All illustrations and images included in CareNotes® are the copyrighted property of [employer]  or [address] [place], [place] [address]   The above information is an  only  It is not intended as medical advice for individual conditions or treatments  Talk to your doctor, nurse or pharmacist before following any medical regimen to see if it is safe and effective for you

## 2023-03-03 DIAGNOSIS — I10 ESSENTIAL HYPERTENSION: ICD-10-CM

## 2023-03-03 NOTE — TELEPHONE ENCOUNTER
PT called today for new script  She was upped to 40mg of Lisinopril, so she has now run out of meds  Needs new script sent through   Please advise

## 2023-03-04 RX ORDER — LISINOPRIL 40 MG/1
40 TABLET ORAL DAILY
Qty: 90 TABLET | Refills: 3 | Status: SHIPPED | OUTPATIENT
Start: 2023-03-04

## 2023-05-24 ENCOUNTER — TELEPHONE (OUTPATIENT)
Dept: FAMILY MEDICINE CLINIC | Facility: CLINIC | Age: 58
End: 2023-05-24

## 2023-07-06 DIAGNOSIS — E78.2 MIXED HYPERLIPIDEMIA: ICD-10-CM

## 2023-07-06 RX ORDER — ATORVASTATIN CALCIUM 20 MG/1
20 TABLET, FILM COATED ORAL
Qty: 90 TABLET | Refills: 3 | Status: SHIPPED | OUTPATIENT
Start: 2023-07-06

## 2023-07-06 NOTE — TELEPHONE ENCOUNTER
Patient requesting refill(s) of: atorvastatin 20 mg daily    Last filled: 1/9/2023 #90 x 1  Last appt: 2/9/2023  Next appt: 7/6/2023  Pharmacy: [facility]

## 2023-10-09 ENCOUNTER — OFFICE VISIT (OUTPATIENT)
Dept: URGENT CARE | Facility: CLINIC | Age: 58
End: 2023-10-09
Payer: COMMERCIAL

## 2023-10-09 VITALS
HEART RATE: 91 BPM | DIASTOLIC BLOOD PRESSURE: 88 MMHG | HEIGHT: 69 IN | WEIGHT: 171.2 LBS | RESPIRATION RATE: 18 BRPM | SYSTOLIC BLOOD PRESSURE: 150 MMHG | BODY MASS INDEX: 25.36 KG/M2 | TEMPERATURE: 96.8 F | OXYGEN SATURATION: 100 %

## 2023-10-09 DIAGNOSIS — B96.89 ACUTE BACTERIAL BRONCHITIS: Primary | ICD-10-CM

## 2023-10-09 DIAGNOSIS — J20.8 ACUTE BACTERIAL BRONCHITIS: Primary | ICD-10-CM

## 2023-10-09 PROCEDURE — 99213 OFFICE O/P EST LOW 20 MIN: CPT | Performed by: PHYSICIAN ASSISTANT

## 2023-10-09 RX ORDER — AZITHROMYCIN 250 MG/1
TABLET, FILM COATED ORAL
Qty: 6 TABLET | Refills: 0 | Status: SHIPPED | OUTPATIENT
Start: 2023-10-09 | End: 2023-10-13

## 2023-10-09 NOTE — PROGRESS NOTES
[facility]      NAME: Barbara Burrows is a 62 y.o. female  : 1965    MRN: [id number]  DATE: 2023  TIME: 12:13 PM    Assessment and Plan   Acute bacterial bronchitis [J20.8, B96.89]  1. Acute bacterial bronchitis  azithromycin (ZITHROMAX) 250 mg tablet          Patient Instructions   I have prescribed an antibiotic for the infection. Please take the antibiotic as prescribed and finish the entire prescription. I recommend that the patient takes an over the counter probiotic or eats yogurt with live cultures in it [place]) to keep good bacteria in the gut and help prevent diarrhea. Wash hands frequently to prevent the spread of infection. Can use over the counter cough and cold medications to help with symptoms. Ibuprofen and/or tylenol as needed for pain or fever. If not improving over the next 3-5 days, follow up with PCP. To present to the ER if symptoms worsen. Chief Complaint     Chief Complaint   Patient presents with   • Cough     Cough, congestion, started 2 weeks ago         History of Present Illness   Barbara Burrows presents to the clinic c/o    URI   This is a new problem. The current episode started 1 to 4 weeks ago. The problem has been gradually worsening. There has been no fever. Associated symptoms include congestion, coughing and a sore throat. Pertinent negatives include no abdominal pain, chest pain, ear pain, headaches, rash or wheezing. She has tried decongestant for the symptoms. The treatment provided no relief. Review of Systems   Review of Systems   Constitutional: Negative for chills, diaphoresis, fatigue and fever. HENT: Positive for congestion, sinus pressure and sore throat. Negative for ear discharge, ear pain and facial swelling. Eyes: Negative for photophobia, pain, discharge, redness, itching and visual disturbance. Respiratory: Positive for cough. Negative for apnea, chest tightness, shortness of breath and wheezing.     Cardiovascular: Negative for chest pain and palpitations. Gastrointestinal: Negative for abdominal pain. Skin: Negative for color change, rash and wound. Neurological: Negative for dizziness and headaches. Hematological: Negative for adenopathy.          Current Medications     Long-Term Medications   Medication Sig Dispense Refill   • atorvastatin (LIPITOR) 20 mg tablet Take 1 tablet (20 mg total) by mouth daily at bedtime 90 tablet 3   • lisinopril (ZESTRIL) 40 mg tablet Take 1 tablet (40 mg total) by mouth daily 90 tablet 3       Current Allergies     Allergies as of 10/09/2023   • (No Known Allergies)            The following portions of the patient's history were reviewed and updated as appropriate: allergies, current medications, past family history, past medical history, past social history, past surgical history and problem list.  Past Medical History:   Diagnosis Date   • Anxiety    • Colon polyp    • Hypertension      Past Surgical History:   Procedure Laterality Date   • BREAST CYST EXCISION Right 30yrs     benign   • EYE SURGERY     • TUBAL LIGATION       Social History     Socioeconomic History   • Marital status: Single     Spouse name: Not on file   • Number of children: Not on file   • Years of education: Not on file   • Highest education level: Not on file   Occupational History   • Not on file   Tobacco Use   • Smoking status: Every Day     Packs/day: 2.00     Years: 45.00     Total pack years: 90.00     Types: Cigarettes   • Smokeless tobacco: Never   Vaping Use   • Vaping Use: Never used   Substance and Sexual Activity   • Alcohol use: Yes     Comment: 3 beer/day   • Drug use: No   • Sexual activity: Not on file   Other Topics Concern   • Not on file   Social History Narrative   • Not on file     Social Determinants of Health     Financial Resource Strain: Not on file   Food Insecurity: Not on file   Transportation Needs: Not on file   Physical Activity: Not on file   Stress: Not on file   Social Connections: Not on file   Intimate Partner Violence: Not on file   Housing Stability: Not on file       Objective   /88   Pulse 91   Temp (!) 96.8 °F (36 °C)   Resp 18   Ht 5' 9" (1.753 m)   Wt 77.7 kg (171 lb 3.2 oz)   SpO2 100%   BMI 25.28 kg/m²      Physical Exam     Physical Exam  Vitals and nursing note reviewed. Constitutional:       General: She is not in acute distress. Appearance: She is well-developed. She is not diaphoretic. HENT:      Head: Normocephalic and atraumatic. Right Ear: Tympanic membrane and external ear normal.      Left Ear: Tympanic membrane and external ear normal.      Nose: Nose normal.      Mouth/Throat:      Mouth: Mucous membranes are moist.      Pharynx: No oropharyngeal exudate or posterior oropharyngeal erythema. Eyes:      General: No scleral icterus. Right eye: No discharge. Left eye: No discharge. Conjunctiva/sclera: Conjunctivae normal.   Cardiovascular:      Rate and Rhythm: Normal rate and regular rhythm. Heart sounds: Normal heart sounds. No murmur heard. No friction rub. No gallop. Pulmonary:      Effort: Pulmonary effort is normal. No respiratory distress. Breath sounds: Examination of the right-lower field reveals wheezing. Examination of the left-lower field reveals wheezing. Wheezing present. No decreased breath sounds, rhonchi or rales. Skin:     General: Skin is warm and dry. Coloration: Skin is not pale. Findings: No erythema or rash. Neurological:      Mental Status: She is alert and oriented to person, place, and time. Psychiatric:         Behavior: Behavior normal.         Thought Content:  Thought content normal.         Judgment: Judgment normal.         Moreno Steve PA-C

## 2023-12-04 ENCOUNTER — APPOINTMENT (OUTPATIENT)
Dept: RADIOLOGY | Facility: CLINIC | Age: 58
End: 2023-12-04
Payer: COMMERCIAL

## 2023-12-04 ENCOUNTER — OFFICE VISIT (OUTPATIENT)
Dept: FAMILY MEDICINE CLINIC | Facility: CLINIC | Age: 58
End: 2023-12-04
Payer: COMMERCIAL

## 2023-12-04 ENCOUNTER — APPOINTMENT (OUTPATIENT)
Dept: LAB | Facility: CLINIC | Age: 58
End: 2023-12-04
Payer: COMMERCIAL

## 2023-12-04 VITALS
HEIGHT: 69 IN | DIASTOLIC BLOOD PRESSURE: 88 MMHG | TEMPERATURE: 97.7 F | RESPIRATION RATE: 20 BRPM | BODY MASS INDEX: 24.59 KG/M2 | SYSTOLIC BLOOD PRESSURE: 140 MMHG | WEIGHT: 166 LBS | OXYGEN SATURATION: 98 % | HEART RATE: 130 BPM

## 2023-12-04 DIAGNOSIS — J40 BRONCHITIS: ICD-10-CM

## 2023-12-04 DIAGNOSIS — I10 ESSENTIAL HYPERTENSION: ICD-10-CM

## 2023-12-04 DIAGNOSIS — J06.9 URI WITH COUGH AND CONGESTION: Primary | ICD-10-CM

## 2023-12-04 DIAGNOSIS — R00.0 SINUS TACHYCARDIA: ICD-10-CM

## 2023-12-04 LAB
ALBUMIN SERPL BCP-MCNC: 4.5 G/DL (ref 3.5–5)
ALP SERPL-CCNC: 88 U/L (ref 34–104)
ALT SERPL W P-5'-P-CCNC: 25 U/L (ref 7–52)
ANION GAP SERPL CALCULATED.3IONS-SCNC: 11 MMOL/L
AST SERPL W P-5'-P-CCNC: 32 U/L (ref 13–39)
BASOPHILS # BLD AUTO: 0.06 THOUSANDS/ÂΜL (ref 0–0.1)
BASOPHILS NFR BLD AUTO: 1 % (ref 0–1)
BILIRUB SERPL-MCNC: 0.41 MG/DL (ref 0.2–1)
BUN SERPL-MCNC: 6 MG/DL (ref 5–25)
CALCIUM SERPL-MCNC: 10 MG/DL (ref 8.4–10.2)
CHLORIDE SERPL-SCNC: 97 MMOL/L (ref 96–108)
CO2 SERPL-SCNC: 24 MMOL/L (ref 21–32)
CREAT SERPL-MCNC: 0.74 MG/DL (ref 0.6–1.3)
D DIMER PPP FEU-MCNC: 0.54 UG/ML FEU
EOSINOPHIL # BLD AUTO: 0.29 THOUSAND/ÂΜL (ref 0–0.61)
EOSINOPHIL NFR BLD AUTO: 4 % (ref 0–6)
ERYTHROCYTE [DISTWIDTH] IN BLOOD BY AUTOMATED COUNT: 12.3 % (ref 11.6–15.1)
GFR SERPL CREATININE-BSD FRML MDRD: 90 ML/MIN/1.73SQ M
GLUCOSE P FAST SERPL-MCNC: 120 MG/DL (ref 65–99)
HCT VFR BLD AUTO: 45.7 % (ref 34.8–46.1)
HGB BLD-MCNC: 15.6 G/DL (ref 11.5–15.4)
IMM GRANULOCYTES # BLD AUTO: 0.03 THOUSAND/UL (ref 0–0.2)
IMM GRANULOCYTES NFR BLD AUTO: 0 % (ref 0–2)
LYMPHOCYTES # BLD AUTO: 1.71 THOUSANDS/ÂΜL (ref 0.6–4.47)
LYMPHOCYTES NFR BLD AUTO: 22 % (ref 14–44)
MCH RBC QN AUTO: 33 PG (ref 26.8–34.3)
MCHC RBC AUTO-ENTMCNC: 34.1 G/DL (ref 31.4–37.4)
MCV RBC AUTO: 97 FL (ref 82–98)
MONOCYTES # BLD AUTO: 1.11 THOUSAND/ÂΜL (ref 0.17–1.22)
MONOCYTES NFR BLD AUTO: 14 % (ref 4–12)
NEUTROPHILS # BLD AUTO: 4.53 THOUSANDS/ÂΜL (ref 1.85–7.62)
NEUTS SEG NFR BLD AUTO: 59 % (ref 43–75)
NRBC BLD AUTO-RTO: 0 /100 WBCS
PLATELET # BLD AUTO: 310 THOUSANDS/UL (ref 149–390)
PMV BLD AUTO: 11 FL (ref 8.9–12.7)
POTASSIUM SERPL-SCNC: 4.1 MMOL/L (ref 3.5–5.3)
PROT SERPL-MCNC: 7.4 G/DL (ref 6.4–8.4)
RBC # BLD AUTO: 4.73 MILLION/UL (ref 3.81–5.12)
SODIUM SERPL-SCNC: 132 MMOL/L (ref 135–147)
TSH SERPL DL<=0.05 MIU/L-ACNC: 2.2 UIU/ML (ref 0.45–4.5)
WBC # BLD AUTO: 7.73 THOUSAND/UL (ref 4.31–10.16)

## 2023-12-04 PROCEDURE — 36415 COLL VENOUS BLD VENIPUNCTURE: CPT

## 2023-12-04 PROCEDURE — 93000 ELECTROCARDIOGRAM COMPLETE: CPT | Performed by: FAMILY MEDICINE

## 2023-12-04 PROCEDURE — 71046 X-RAY EXAM CHEST 2 VIEWS: CPT

## 2023-12-04 PROCEDURE — 84443 ASSAY THYROID STIM HORMONE: CPT

## 2023-12-04 PROCEDURE — 85379 FIBRIN DEGRADATION QUANT: CPT

## 2023-12-04 PROCEDURE — 99215 OFFICE O/P EST HI 40 MIN: CPT | Performed by: FAMILY MEDICINE

## 2023-12-04 PROCEDURE — 85025 COMPLETE CBC W/AUTO DIFF WBC: CPT

## 2023-12-04 PROCEDURE — 80053 COMPREHEN METABOLIC PANEL: CPT

## 2023-12-04 RX ORDER — PREDNISONE 20 MG/1
40 TABLET ORAL DAILY
Qty: 10 TABLET | Refills: 0 | Status: SHIPPED | OUTPATIENT
Start: 2023-12-04 | End: 2023-12-09

## 2023-12-04 RX ORDER — ALBUTEROL SULFATE 90 UG/1
2 AEROSOL, METERED RESPIRATORY (INHALATION) EVERY 6 HOURS PRN
Qty: 8.5 G | Refills: 5 | Status: SHIPPED | OUTPATIENT
Start: 2023-12-04

## 2023-12-04 RX ORDER — AZITHROMYCIN 250 MG/1
TABLET, FILM COATED ORAL
Qty: 6 TABLET | Refills: 0 | Status: SHIPPED | OUTPATIENT
Start: 2023-12-04 | End: 2023-12-09

## 2023-12-04 RX ORDER — AMLODIPINE BESYLATE 5 MG/1
5 TABLET ORAL DAILY
Qty: 30 TABLET | Refills: 5 | Status: SHIPPED | OUTPATIENT
Start: 2023-12-04

## 2023-12-04 NOTE — PROGRESS NOTES
Assessment/Plan:       Problem List Items Addressed This Visit          Cardiovascular and Mediastinum    Essential hypertension    Relevant Medications    amLODIPine (NORVASC) 5 mg tablet     Other Visit Diagnoses       URI with cough and congestion    -  Primary    Relevant Medications    albuterol (ProAir HFA) 90 mcg/act inhaler    azithromycin (Zithromax) 250 mg tablet    predniSONE 20 mg tablet    Bronchitis        Relevant Medications    albuterol (ProAir HFA) 90 mcg/act inhaler    azithromycin (Zithromax) 250 mg tablet    predniSONE 20 mg tablet    Other Relevant Orders    CBC and differential (Completed)    Comprehensive metabolic panel (Completed)    XR chest pa & lateral (Completed)    Sinus tachycardia        Relevant Orders    TSH, 3rd generation with Free T4 reflex (Completed)    D-dimer, quantitative (Completed)    POCT ECG (Completed)              Given tachycardia, HTN and symptoms EKG in office performed- showed sinus tach rate 124, likely JANE. Recommended ED eval further evaluation, she refused but agreeable to labs and chest XR (ordered by PCP.)  Antibiotic and prednisone provided, albuterol PRN. Add amlodipine 5 mg daily current regimen. I have spent a total time of 45 minutes on 12/04/23 in caring for this patient including Risks and benefits of tx options, Instructions for management, Importance of tx compliance, Impressions, Counseling / Coordination of care, Documenting in the medical record, Reviewing / ordering tests, medicine, procedures  , Obtaining or reviewing history  , and Communicating with other healthcare professionals . Subjective:      Patient ID: Jay Rosas is a 62 y.o. female. HPI    Started with cough Wednesday into Thursday, productive cough, breathing tight/feels pressure. Congestion, sore throat. Shortness of breath going up and down steps. Taking OTC Tylenol cold and flu, Mucinex 12 hour, Coricidin HBP, took BP pill at 10:30.  No chest pains, but it does feel tight like a pressure on chest.     The following portions of the patient's history were reviewed and updated as appropriate: allergies, current medications, past family history, past medical history, past social history, past surgical history, and problem list.    Review of Systems   All other systems reviewed and are negative. Objective:      /88   Pulse (!) 130   Temp 97.7 °F (36.5 °C) (Tympanic)   Resp 20   Ht 5' 9" (1.753 m)   Wt 75.3 kg (166 lb)   SpO2 98%   BMI 24.51 kg/m²          Physical Exam  Vitals reviewed. Constitutional:       General: She is not in acute distress. Appearance: Normal appearance. She is not ill-appearing, toxic-appearing or diaphoretic. HENT:      Head: Normocephalic and atraumatic. Right Ear: Tympanic membrane, ear canal and external ear normal. There is no impacted cerumen. Left Ear: Tympanic membrane, ear canal and external ear normal. There is no impacted cerumen. Nose: Congestion present. Mouth/Throat:      Mouth: Mucous membranes are moist.      Pharynx: No oropharyngeal exudate or posterior oropharyngeal erythema. Comments: Couple small white patches posterior right pharynx appears to be scarring, given smoking history advised to watch/monitor with PCP  Eyes:      General:         Right eye: No discharge. Left eye: No discharge. Extraocular Movements: Extraocular movements intact. Conjunctiva/sclera: Conjunctivae normal.   Cardiovascular:      Rate and Rhythm: Normal rate and regular rhythm. Heart sounds: Normal heart sounds. No murmur heard. No friction rub. No gallop. Pulmonary:      Effort: Pulmonary effort is normal. No respiratory distress. Breath sounds: Normal breath sounds. No stridor. No wheezing or rhonchi. Musculoskeletal:         General: No swelling, tenderness or signs of injury. Right lower leg: No edema. Left lower leg: No edema. Skin:     General: Skin is warm. Coloration: Skin is not pale. Findings: No erythema or rash. Neurological:      Mental Status: She is alert and oriented to person, place, and time. Motor: No weakness.    Psychiatric:         Mood and Affect: Mood normal.         Behavior: Behavior normal.             DO [facility]

## 2023-12-05 DIAGNOSIS — J18.9 COMMUNITY ACQUIRED PNEUMONIA OF RIGHT MIDDLE LOBE OF LUNG: Primary | ICD-10-CM

## 2023-12-05 RX ORDER — CEFPODOXIME PROXETIL 200 MG/1
200 TABLET, FILM COATED ORAL 2 TIMES DAILY
Qty: 14 TABLET | Refills: 0 | Status: SHIPPED | OUTPATIENT
Start: 2023-12-05 | End: 2023-12-12

## 2023-12-07 ENCOUNTER — OFFICE VISIT (OUTPATIENT)
Dept: FAMILY MEDICINE CLINIC | Facility: CLINIC | Age: 58
End: 2023-12-07
Payer: COMMERCIAL

## 2023-12-07 VITALS
WEIGHT: 169 LBS | TEMPERATURE: 97.5 F | SYSTOLIC BLOOD PRESSURE: 152 MMHG | DIASTOLIC BLOOD PRESSURE: 90 MMHG | HEART RATE: 117 BPM | OXYGEN SATURATION: 95 % | RESPIRATION RATE: 16 BRPM | HEIGHT: 69 IN | BODY MASS INDEX: 25.03 KG/M2

## 2023-12-07 DIAGNOSIS — R93.89 ABNORMAL CHEST X-RAY: ICD-10-CM

## 2023-12-07 DIAGNOSIS — J18.9 COMMUNITY ACQUIRED PNEUMONIA OF RIGHT MIDDLE LOBE OF LUNG: Primary | ICD-10-CM

## 2023-12-07 DIAGNOSIS — Z12.31 ENCOUNTER FOR SCREENING MAMMOGRAM FOR MALIGNANT NEOPLASM OF BREAST: ICD-10-CM

## 2023-12-07 DIAGNOSIS — R79.89 ELEVATED D-DIMER: ICD-10-CM

## 2023-12-07 DIAGNOSIS — R00.0 SINUS TACHYCARDIA: ICD-10-CM

## 2023-12-07 PROCEDURE — 99214 OFFICE O/P EST MOD 30 MIN: CPT | Performed by: INTERNAL MEDICINE

## 2023-12-07 NOTE — PATIENT INSTRUCTIONS
Please get xray of thoracic spine today due to nodule on chest xray  Follow up in 4-6 weeks for your physical  If lightheadedness, chest tightness, recommend CT

## 2023-12-07 NOTE — PROGRESS NOTES
[facility]        NAME: Chetna Haynes is a 62 y.o. female  : 1965    MRN: [id number]  DATE: 2023  TIME: 7:54 AM    Assessment and Plan   1. Community acquired pneumonia of right middle lobe of lung  - on Z-pack and vantin, improved. Discussed CT evaluation given smoking history     2. Encounter for screening mammogram for malignant neoplasm of breast  -     Mammo screening bilateral w 3d & cad; Future; Expected date: 2023    3. Sinus tachycardia  -d-dimer slightly elevated, which could be from smoking, discussed CT evaluation, TSH normal      - CTA chest pe study; Future; Expected date: 2023    4. Abnormal chest x-ray  -   xray showing nodular opacity over thoracic spine, unclear whether osteophyte vs lung nodule, will get xray thoracic spine for further evaluation   -   XR spine thoracic 3 vw; Future; Expected date: 2023    5. Elevated d-dimer  -     CTA chest pe study; Future; Expected date: 2023             Chief Complaint     Chief Complaint   Patient presents with   • Follow-up         History of Present Illness       60yo female with HTN here for follow up CAP. Feeling better, taking Z-pack and vantin, still coughing but improved. No chest pain, lightheadedness or dizziness. Home HR . Review of Systems   Review of Systems   Constitutional:  Negative for appetite change, chills, fatigue and fever. HENT:  Positive for voice change. Respiratory:  Positive for cough. Negative for chest tightness and shortness of breath. Cardiovascular:  Negative for chest pain and palpitations. Neurological:  Negative for dizziness and light-headedness.          Current Medications       Current Outpatient Medications:   •  albuterol (ProAir HFA) 90 mcg/act inhaler, Inhale 2 puffs every 6 (six) hours as needed for wheezing, Disp: 8.5 g, Rfl: 5  •  amLODIPine (NORVASC) 5 mg tablet, Take 1 tablet (5 mg total) by mouth daily, Disp: 30 tablet, Rfl: 5  •  atorvastatin (LIPITOR) 20 mg tablet, Take 1 tablet (20 mg total) by mouth daily at bedtime, Disp: 90 tablet, Rfl: 3  •  cefpodoxime (VANTIN) 200 mg tablet, Take 1 tablet (200 mg total) by mouth 2 (two) times a day for 7 days, Disp: 14 tablet, Rfl: 0  •  lisinopril (ZESTRIL) 40 mg tablet, Take 1 tablet (40 mg total) by mouth daily, Disp: 90 tablet, Rfl: 3    Current Allergies     Allergies as of 12/07/2023   • (No Known Allergies)            The following portions of the patient's history were reviewed and updated as appropriate: allergies, current medications, past family history, past medical history, past social history, past surgical history and problem list.     Past Medical History:   Diagnosis Date   • Anxiety    • Colon polyp    • Hypertension        Past Surgical History:   Procedure Laterality Date   • BREAST CYST EXCISION Right 30yrs     benign   • EYE SURGERY     • TUBAL LIGATION         Family History   Problem Relation Age of Onset   • Heart disease Mother    • Hypertension Mother    • Cancer Mother    • Heart disease Father    • Hypertension Father    • Aneurysm Father    • No Known Problems Sister    • No Known Problems Daughter    • No Known Problems Maternal Grandmother    • No Known Problems Paternal Grandmother    • No Known Problems Sister    • No Known Problems Sister    • No Known Problems Daughter    • No Known Problems Maternal Aunt    • No Known Problems Maternal Aunt    • No Known Problems Paternal Aunt          Medications have been verified. Objective   /90   Pulse (!) 117   Temp 97.5 °F (36.4 °C) (Tympanic)   Resp 16   Ht 5' 9" (1.753 m)   Wt 76.7 kg (169 lb)   SpO2 95%   BMI 24.96 kg/m²        Physical Exam     Physical Exam  Vitals reviewed. Constitutional:       General: She is not in acute distress. Appearance: She is normal weight. Cardiovascular:      Rate and Rhythm: Regular rhythm. Tachycardia present. Heart sounds: No murmur heard. No friction rub. No gallop. Pulmonary:      Effort: Pulmonary effort is normal. No respiratory distress. Breath sounds: Wheezing and rhonchi present. Neurological:      General: No focal deficit present. Mental Status: She is alert.    Psychiatric:         Mood and Affect: Mood normal.         Behavior: Behavior normal.             Results:  Lab Results   Component Value Date    SODIUM 132 (L) 12/04/2023    K 4.1 12/04/2023    CL 97 12/04/2023    CO2 24 12/04/2023    BUN 6 12/04/2023    CREATININE 0.74 12/04/2023    CALCIUM 10.0 12/04/2023       Lab Results   Component Value Date    HGBA1C 5.3 07/16/2020       Lab Results   Component Value Date    WBC 7.73 12/04/2023    HGB 15.6 (H) 12/04/2023    HCT 45.7 12/04/2023    MCV 97 12/04/2023     12/04/2023

## 2024-02-26 DIAGNOSIS — I10 ESSENTIAL HYPERTENSION: ICD-10-CM

## 2024-02-26 RX ORDER — LISINOPRIL 40 MG/1
40 TABLET ORAL DAILY
Qty: 90 TABLET | Refills: 3 | Status: SHIPPED | OUTPATIENT
Start: 2024-02-26

## 2024-02-26 NOTE — TELEPHONE ENCOUNTER
Patient requesting refill(s) of: lisinopril 40 mg daily     Last filled: 3/4/2023 #90 x 3  Last appt: 12/7/2023  Next appt: none  Pharmacy: [facility]

## 2024-04-19 ENCOUNTER — TELEPHONE (OUTPATIENT)
Age: 59
End: 2024-04-19

## 2024-04-19 NOTE — TELEPHONE ENCOUNTER
Does the patient want to see a Gastroenterologist prior to their procedure OR are they having any GI symptoms? no    Has the patient been hospitalized or had abdominal surgery in the past 6 months? no    Does the patient use supplemental oxygen? no    Does the patient take Coumadin, Lovenox, Plavix, Elliquis, Xarelto, or other blood thinning medication? no    Has the patient had a stroke, cardiac event, or stent placed in the past year? no        BMI 24.96

## 2024-04-19 NOTE — TELEPHONE ENCOUNTER
Scheduled date of colonoscopy (as of today):7/3/24  Physician performing colonoscopy:DR WALKER  Location of colonoscopy:[place]  Bowel prep reviewed with patient:JOEL/FELICIA SENT TO MY CHART  Instructions reviewed with patient by:LEON  Clearances: NIMISHA

## 2024-05-27 DIAGNOSIS — I10 ESSENTIAL HYPERTENSION: ICD-10-CM

## 2024-05-27 RX ORDER — AMLODIPINE BESYLATE 5 MG/1
5 TABLET ORAL DAILY
Qty: 30 TABLET | Refills: 5 | Status: SHIPPED | OUTPATIENT
Start: 2024-05-27

## 2024-06-04 ENCOUNTER — TELEPHONE (OUTPATIENT)
Age: 59
End: 2024-06-04

## 2024-06-28 DIAGNOSIS — E78.2 MIXED HYPERLIPIDEMIA: ICD-10-CM

## 2024-06-29 RX ORDER — ATORVASTATIN CALCIUM 20 MG/1
20 TABLET, FILM COATED ORAL
Qty: 30 TABLET | Refills: 0 | Status: SHIPPED | OUTPATIENT
Start: 2024-06-29

## 2024-08-06 RX ORDER — SODIUM CHLORIDE, SODIUM LACTATE, POTASSIUM CHLORIDE, CALCIUM CHLORIDE 600; 310; 30; 20 MG/100ML; MG/100ML; MG/100ML; MG/100ML
125 INJECTION, SOLUTION INTRAVENOUS CONTINUOUS
Status: CANCELLED | OUTPATIENT
Start: 2024-08-06

## 2024-08-07 ENCOUNTER — PREP FOR PROCEDURE (OUTPATIENT)
Age: 59
End: 2024-08-07

## 2024-08-07 ENCOUNTER — HOSPITAL ENCOUNTER (OUTPATIENT)
Dept: GASTROENTEROLOGY | Facility: HOSPITAL | Age: 59
Setting detail: OUTPATIENT SURGERY
Discharge: HOME | End: 2024-08-07
Attending: INTERNAL MEDICINE
Payer: COMMERCIAL

## 2024-08-07 ENCOUNTER — TELEPHONE (OUTPATIENT)
Age: 59
End: 2024-08-07

## 2024-08-07 ENCOUNTER — ANESTHESIA (OUTPATIENT)
Dept: GASTROENTEROLOGY | Facility: HOSPITAL | Age: 59
End: 2024-08-07

## 2024-08-07 ENCOUNTER — ANESTHESIA EVENT (OUTPATIENT)
Dept: GASTROENTEROLOGY | Facility: HOSPITAL | Age: 59
End: 2024-08-07

## 2024-08-07 VITALS
OXYGEN SATURATION: 99 % | RESPIRATION RATE: 20 BRPM | DIASTOLIC BLOOD PRESSURE: 66 MMHG | SYSTOLIC BLOOD PRESSURE: 114 MMHG | TEMPERATURE: 98.1 F | HEART RATE: 87 BPM

## 2024-08-07 DIAGNOSIS — Z12.11 SCREENING FOR COLON CANCER: ICD-10-CM

## 2024-08-07 DIAGNOSIS — Z86.010 HX OF COLONIC POLYPS: Primary | ICD-10-CM

## 2024-08-07 DIAGNOSIS — Z86.010 HISTORY OF COLON POLYPS: ICD-10-CM

## 2024-08-07 PROBLEM — F17.200 SMOKING: Status: ACTIVE | Noted: 2024-08-07

## 2024-08-07 PROBLEM — IMO0001 SMOKING: Status: ACTIVE | Noted: 2024-08-07

## 2024-08-07 PROBLEM — F41.9 ANXIETY: Status: ACTIVE | Noted: 2024-08-07

## 2024-08-07 PROBLEM — I25.10 CAD (CORONARY ARTERY DISEASE): Status: ACTIVE | Noted: 2024-08-07

## 2024-08-07 PROCEDURE — 45385 COLONOSCOPY W/LESION REMOVAL: CPT | Performed by: INTERNAL MEDICINE

## 2024-08-07 PROCEDURE — 88305 TISSUE EXAM BY PATHOLOGIST: CPT | Performed by: PATHOLOGY

## 2024-08-07 RX ORDER — SODIUM CHLORIDE, SODIUM LACTATE, POTASSIUM CHLORIDE, CALCIUM CHLORIDE 600; 310; 30; 20 MG/100ML; MG/100ML; MG/100ML; MG/100ML
125 INJECTION, SOLUTION INTRAVENOUS CONTINUOUS
Status: DISCONTINUED | OUTPATIENT
Start: 2024-08-07 | End: 2024-08-07

## 2024-08-07 RX ORDER — PROPOFOL 10 MG/ML
INJECTION, EMULSION INTRAVENOUS AS NEEDED
Status: DISCONTINUED | OUTPATIENT
Start: 2024-08-07 | End: 2024-08-07

## 2024-08-07 RX ORDER — LIDOCAINE HYDROCHLORIDE 20 MG/ML
INJECTION, SOLUTION EPIDURAL; INFILTRATION; INTRACAUDAL; PERINEURAL AS NEEDED
Status: DISCONTINUED | OUTPATIENT
Start: 2024-08-07 | End: 2024-08-07

## 2024-08-07 RX ADMIN — PROPOFOL 50 MG: 10 INJECTION, EMULSION INTRAVENOUS at 09:07

## 2024-08-07 RX ADMIN — LIDOCAINE HYDROCHLORIDE 60 MG: 20 INJECTION, SOLUTION EPIDURAL; INFILTRATION; INTRACAUDAL; PERINEURAL at 08:54

## 2024-08-07 RX ADMIN — SODIUM CHLORIDE, SODIUM LACTATE, POTASSIUM CHLORIDE, AND CALCIUM CHLORIDE 125 ML/HR: .6; .31; .03; .02 INJECTION, SOLUTION INTRAVENOUS at 08:01

## 2024-08-07 RX ADMIN — PROPOFOL 50 MG: 10 INJECTION, EMULSION INTRAVENOUS at 08:54

## 2024-08-07 RX ADMIN — PROPOFOL 130 MCG/KG/MIN: 10 INJECTION, EMULSION INTRAVENOUS at 08:56

## 2024-08-07 RX ADMIN — PROPOFOL 50 MG: 10 INJECTION, EMULSION INTRAVENOUS at 08:55

## 2024-08-07 NOTE — TELEPHONE ENCOUNTER
Scheduled date of colonoscopy (as of today): 2/27/2024  Physician performing colonoscopy: DR WALKER  Location of colonoscopy: [place]  Bowel prep reviewed with patient: TBA  Instructions reviewed with patient by: TBA  Clearances:     Patient requesting prep to be mailed.    As per providers note:     RECOMMENDATION:  Repeat colonoscopy in 6 months to 9 months, due: 2/3/2025  Personal history of colon polyps  Inadequate bowel preparation       Would recommend a standard 4 L preparation for next procedure and 3 bisacodyl tablets.  I would have no objection to an office visit prior to next procedure.

## 2024-08-07 NOTE — H&P
History and Physical -  [facility]  Mirta Horton 58 y.o. female MRN: [id number]                  HPI: Mirta Horton is a 58 y.o. year old female who presents for open access outpatient surveillance colonoscopy.  Mirta does have a history of colon polyps.  Specifically back in November 2020 she had 2 flat polyps in the proximal transverse colon measuring 3 to 8 mm that were tubular adenomas.  She had a 15 mm polyp in the proximal transverse colon removed in piecemeal technique.  A tattoo was placed just distal to this lesion.  There is a 10 mm polyp in the proximal sigmoid colon.  All polyps were tubular adenomas.  In June 2021 she underwent colonoscopy.  There is a scar in the proximal transverse colon polypectomy site without residual tissue.  There is a 5 mm polyp removed and biopsy was negative there is also a 5 mm polyp in the distal ascending colon that was a tubular adenoma.    She presents today for surveillance colonoscopy.  She denies any change in her bowel habits.  Denies any diarrhea constipation bleeding or black stools.  For the most part she does not get much a lot of heartburn or indigestion.  Denies any dysphagia, nausea, or vomiting.  She did get some heartburn with the Gatorade preparation last night.    There is no known family history colon cancer or colon polyps.  She is aware that her children should be under goal colonoscopy at age 40.    She does smoke anywhere from 1-1/2 to 2 packs cigarettes a day.  She has about 10 beers a week.      REVIEW OF SYSTEMS: Per the HPI, and otherwise unremarkable.    Historical Information   Past Medical History:   Diagnosis Date    Anxiety     Colon polyp     Hypertension      Past Surgical History:   Procedure Laterality Date    BREAST CYST EXCISION Right 30yrs     benign    EYE SURGERY      TUBAL LIGATION       Social History   Social History     Substance and Sexual Activity   Alcohol Use Yes    Comment: 3 beer/day     Social History      Substance and Sexual Activity   Drug Use No     Social History     Tobacco Use   Smoking Status Every Day    Current packs/day: 2.00    Average packs/day: 2.0 packs/day for 45.0 years (90.0 ttl pk-yrs)    Types: Cigarettes   Smokeless Tobacco Never     Family History   Problem Relation Age of Onset    Heart disease Mother     Hypertension Mother     Cancer Mother     Heart disease Father     Hypertension Father     Aneurysm Father     No Known Problems Sister     No Known Problems Daughter     No Known Problems Maternal Grandmother     No Known Problems Paternal Grandmother     No Known Problems Sister     No Known Problems Sister     No Known Problems Daughter     No Known Problems Maternal Aunt     No Known Problems Maternal Aunt     No Known Problems Paternal Aunt        Meds/Allergies       Current Outpatient Medications:     amLODIPine (NORVASC) 5 mg tablet    atorvastatin (LIPITOR) 20 mg tablet    lisinopril (ZESTRIL) 40 mg tablet    albuterol (ProAir HFA) 90 mcg/act inhaler    Current Facility-Administered Medications:     lactated ringers infusion, 125 mL/hr, Intravenous, Continuous, 125 mL/hr at 08/07/24 0801    No Known Allergies    Objective     /84   Pulse 98   Temp (!) 97.4 °F (36.3 °C) (Temporal)   Resp 18   SpO2 99%       PHYSICAL EXAM    Gen: NAD  Head: NCAT  CV: RRR  CHEST: Clear  ABD: soft, NT/ND  EXT: no edema      ASSESSMENT/PLAN:  This is a 58 y.o. year old female here for colonoscopy, and she is stable and optimized for her procedure.

## 2024-08-07 NOTE — ANESTHESIA POSTPROCEDURE EVALUATION
Post-Op Assessment Note    CV Status:  Stable  Pain Score: 0    Pain management: adequate       Mental Status:  Arousable   Hydration Status:  Stable   PONV Controlled:  None   Airway Patency:  Positional obstruction and patent     Post Op Vitals Reviewed: Yes    No anethesia notable event occurred.    Staff: MARY           BP   176/91   Temp      Pulse  100   Resp   22   SpO2   100%

## 2024-08-07 NOTE — ANESTHESIA PREPROCEDURE EVALUATION
Procedure:  COLONOSCOPY    Relevant Problems   ANESTHESIA (within normal limits)      CARDIO   (+) CAD (coronary artery disease)   (+) Essential hypertension      ENDO (within normal limits)      GI/HEPATIC  Confirmed NPO appropriate  S/p bowel prep      /RENAL (within normal limits)      HEMATOLOGY (within normal limits)      MUSCULOSKELETAL (within normal limits)      NEURO/PSYCH   (+) Anxiety      PULMONARY  Pneumonia 2/2024 and used albuterol during that episode but has not requrired since then.     (+) Smoking   (-) URI (upper respiratory infection)      Behavioral Health   (+) Tobacco use disorder        Physical Exam    Airway    Mallampati score: II  TM Distance: >3 FB  Neck ROM: full     Dental    upper dentures    Cardiovascular  Rhythm: regular, Rate: normal    Pulmonary   Decreased breath sounds, No wheezes    Other Findings  post-pubertal.      Anesthesia Plan  ASA Score- 2     Anesthesia Type- IV sedation with anesthesia with ASA Monitors.         Additional Monitors:     Airway Plan:     Comment: I discussed the risks and benefits of IV sedation anesthesia including the possibility of the need to convert to general anesthesia and the potential risk of awareness.  The patient was given the opportunity to ask questions, which were answered..       Plan Factors-Exercise tolerance (METS): >4 METS.    Chart reviewed.        Patient is a current smoker.  Patient instructed to abstain from smoking on day of procedure. Patient smoked on day of surgery.            Induction- intravenous.    Postoperative Plan-         Informed Consent- Anesthetic plan and risks discussed with patient.  I personally reviewed this patient with the CRNA. Discussed and agreed on the Anesthesia Plan with the CRNA..

## 2024-08-09 PROCEDURE — 88305 TISSUE EXAM BY PATHOLOGIST: CPT | Performed by: PATHOLOGY

## 2024-08-09 NOTE — RESULT ENCOUNTER NOTE
Please inform patient that colon polyp is a benign precancerous polyp that was completely removed.  I would recommend repeating colonoscopy in 6 to 9 months with a standard 4 L preparation and 3 bisacodyl tablets.  I am happy to see her in the office prior to that procedure as well may be in about 6-7 months so that we can make sure she has a proper preparation.,  Okay to lock in her colonoscopy date with me in the future as well) thank you

## 2024-08-15 ENCOUNTER — TELEPHONE (OUTPATIENT)
Age: 59
End: 2024-08-15

## 2024-08-15 NOTE — TELEPHONE ENCOUNTER
Patients GI provider:       Number to return call: [phone]    Reason for call: Pt called in regard to results. Pt stated [facility] had [facility] [employer] [employer]. Pt verbalized understanding.      Winter Gómez MA  8/15/2024  8:59 AM EDT       2nd attempt lmom  Letter placed in mail and sent to [facility]    Coral Henderson MA  8/12/2024  3:02 PM EDT       Left message for patient requesting call back.    Chris Pascual,   8/9/2024  2:36 PM EDT       Please inform patient that colon polyp is a benign precancerous polyp that was completely removed.  I would recommend repeating colonoscopy in 6 to 9 months with a standard 4 L preparation and 3 bisacodyl tablets.  I am happy to see her in the office prior to that procedure as well may be in about 6-7 months so that we can make sure she has a proper preparation.,  Okay to lock in her colonoscopy date with me in the future as well) thank you       Scheduled procedure/appointment date if applicable: 1/9/2025

## 2024-08-28 DIAGNOSIS — E78.2 MIXED HYPERLIPIDEMIA: ICD-10-CM

## 2024-08-28 RX ORDER — ATORVASTATIN CALCIUM 20 MG/1
20 TABLET, FILM COATED ORAL
Qty: 30 TABLET | Refills: 0 | Status: SHIPPED | OUTPATIENT
Start: 2024-08-28

## 2024-08-28 NOTE — TELEPHONE ENCOUNTER
Patient will return call to schedule a physical.      Medication: Atorvastatin     Dose/Frequency: 20 mg     Quantity: 30    Pharmacy: [id number]    Office:   [x] PCP/Provider - Dr. Garcia  [] Speciality/Provider -     Does the patient have enough for 3 days?   [] Yes   [x] No - Send as HP to POD

## 2024-11-26 DIAGNOSIS — I10 ESSENTIAL HYPERTENSION: ICD-10-CM

## 2024-11-27 ENCOUNTER — OFFICE VISIT (OUTPATIENT)
Dept: FAMILY MEDICINE CLINIC | Facility: CLINIC | Age: 59
End: 2024-11-27
Payer: COMMERCIAL

## 2024-11-27 VITALS
RESPIRATION RATE: 18 BRPM | HEIGHT: 69 IN | WEIGHT: 171 LBS | BODY MASS INDEX: 25.33 KG/M2 | DIASTOLIC BLOOD PRESSURE: 60 MMHG | HEART RATE: 111 BPM | SYSTOLIC BLOOD PRESSURE: 102 MMHG | TEMPERATURE: 98 F | OXYGEN SATURATION: 98 %

## 2024-11-27 DIAGNOSIS — J01.80 OTHER ACUTE SINUSITIS, RECURRENCE NOT SPECIFIED: ICD-10-CM

## 2024-11-27 DIAGNOSIS — Z00.00 ANNUAL PHYSICAL EXAM: Primary | ICD-10-CM

## 2024-11-27 DIAGNOSIS — Z13.1 SCREENING FOR DIABETES MELLITUS: ICD-10-CM

## 2024-11-27 DIAGNOSIS — R53.83 OTHER FATIGUE: ICD-10-CM

## 2024-11-27 DIAGNOSIS — Z00.00 ANNUAL WELLNESS VISIT: ICD-10-CM

## 2024-11-27 DIAGNOSIS — E78.2 MIXED HYPERLIPIDEMIA: ICD-10-CM

## 2024-11-27 DIAGNOSIS — I10 HYPERTENSION, UNSPECIFIED TYPE: ICD-10-CM

## 2024-11-27 DIAGNOSIS — J18.9 COMMUNITY ACQUIRED PNEUMONIA OF RIGHT MIDDLE LOBE OF LUNG: ICD-10-CM

## 2024-11-27 PROCEDURE — 99396 PREV VISIT EST AGE 40-64: CPT | Performed by: NURSE PRACTITIONER

## 2024-11-27 PROCEDURE — 99214 OFFICE O/P EST MOD 30 MIN: CPT | Performed by: NURSE PRACTITIONER

## 2024-11-27 RX ORDER — AMLODIPINE BESYLATE 5 MG/1
5 TABLET ORAL DAILY
Qty: 30 TABLET | Refills: 0 | Status: SHIPPED | OUTPATIENT
Start: 2024-11-27

## 2024-11-27 RX ORDER — ATORVASTATIN CALCIUM 20 MG/1
20 TABLET, FILM COATED ORAL
Qty: 30 TABLET | Refills: 0 | Status: SHIPPED | OUTPATIENT
Start: 2024-11-27

## 2024-11-27 RX ORDER — AZITHROMYCIN 250 MG/1
TABLET, FILM COATED ORAL
Qty: 6 TABLET | Refills: 0 | Status: SHIPPED | OUTPATIENT
Start: 2024-11-27 | End: 2024-12-01

## 2024-11-27 RX ORDER — PREDNISONE 10 MG/1
TABLET ORAL
Qty: 20 TABLET | Refills: 0 | Status: SHIPPED | OUTPATIENT
Start: 2024-11-27 | End: 2024-12-05

## 2024-11-27 NOTE — PROGRESS NOTES
"Name: Mirta Horton      : 1965      MRN: [id number]  Encounter Provider: MELA Carpenter  Encounter Date: 2024   Encounter department: [facility]  :  Assessment & Plan           History of Present Illness     HPI  Review of Systems       Objective   /60   Pulse (!) 111   Temp 98 °F (36.7 °C)   Resp 18   Ht 5' 9\" (1.753 m)   Wt 77.6 kg (171 lb)   SpO2 98%   BMI 25.25 kg/m²      Physical Exam    "

## 2024-11-27 NOTE — PATIENT INSTRUCTIONS
"Patient Education     Routine physical for adults   The Basics   Written by the doctors and editors at [facility]   What is a physical? -- A physical is a routine visit, or \"check-up,\" with your doctor. You might also hear it called a \"wellness visit\" or \"preventive visit.\"  During each visit, the doctor will:   Ask about your physical and mental health   Ask about your habits, behaviors, and lifestyle   Do an exam   Give you vaccines if needed   Talk to you about any medicines you take   Give advice about your health   Answer your questions  Getting regular check-ups is an important part of taking care of your health. It can help your doctor find and treat any problems you have. But it's also important for preventing health problems.  A routine physical is different from a \"sick visit.\" A sick visit is when you see a doctor because of a health concern or problem. Since physicals are scheduled ahead of time, you can think about what you want to ask the doctor.  How often should I get a physical? -- It depends on your age and health. In general, for people age 21 years and older:   If you are younger than 50 years, you might be able to get a physical every 3 years.   If you are 50 years or older, your doctor might recommend a physical every year.  If you have an ongoing health condition, like diabetes or high blood pressure, your doctor will probably want to see you more often.  What happens during a physical? -- In general, each visit will include:   Physical exam - The doctor or nurse will check your height, weight, heart rate, and blood pressure. They will also look at your eyes and ears. They will ask about how you are feeling and whether you have any symptoms that bother you.   Medicines - It's a good idea to bring a list of all the medicines you take to each doctor visit. Your doctor will talk to you about your medicines and answer any questions. Tell them if you are having any side effects that bother you. You " "should also tell them if you are having trouble paying for any of your medicines.   Habits and behaviors - This includes:   Your diet   Your exercise habits   Whether you smoke, drink alcohol, or use drugs   Whether you are sexually active   Whether you feel safe at home  Your doctor will talk to you about things you can do to improve your health and lower your risk of health problems. They will also offer help and support. For example, if you want to quit smoking, they can give you advice and might prescribe medicines. If you want to improve your diet or get more physical activity, they can help you with this, too.   Lab tests, if needed - The tests you get will depend on your age and situation. For example, your doctor might want to check your:   Cholesterol   Blood sugar   Iron level   Vaccines - The recommended vaccines will depend on your age, health, and what vaccines you already had. Vaccines are very important because they can prevent certain serious or deadly infections.   Discussion of screening - \"Screening\" means checking for diseases or other health problems before they cause symptoms. Your doctor can recommend screening based on your age, risk, and preferences. This might include tests to check for:   Cancer, such as breast, prostate, cervical, ovarian, colorectal, prostate, lung, or skin cancer   Sexually transmitted infections, such as chlamydia and gonorrhea   Mental health conditions like depression and anxiety  Your doctor will talk to you about the different types of screening tests. They can help you decide which screenings to have. They can also explain what the results might mean.   Answering questions - The physical is a good time to ask the doctor or nurse questions about your health. If needed, they can refer you to other doctors or specialists, too.  Adults older than 65 years often need other care, too. As you get older, your doctor will talk to you about:   How to prevent falling at " home   Hearing or vision tests   Memory testing   How to take your medicines safely   Making sure that you have the help and support you need at home  All topics are updated as new evidence becomes available and our peer review process is complete.  This topic retrieved from [employer] on: May 02, 2024.  Topic 830059 Version 1.0  Release: 32.4.3 - C32.122  © 2024 UpToDate, Inc. and/or its affiliates. All rights reserved.  Consumer Information Use and Disclaimer   Disclaimer: This generalized information is a limited summary of diagnosis, treatment, and/or medication information. It is not meant to be comprehensive and should be used as a tool to help the user understand and/or assess potential diagnostic and treatment options. It does NOT include all information about conditions, treatments, medications, side effects, or risks that may apply to a specific patient. It is not intended to be medical advice or a substitute for the medical advice, diagnosis, or treatment of a health care provider based on the health care provider's examination and assessment of a patient's specific and unique circumstances. Patients must speak with a health care provider for complete information about their health, medical questions, and treatment options, including any risks or benefits regarding use of medications. This information does not endorse any treatments or medications as safe, effective, or approved for treating a specific patient. UpToDate, Inc. and its affiliates disclaim any warranty or liability relating to this information or the use thereof.The use of this information is governed by the Terms of Use, available at https://www.woltersRevionicsuwer.com/en/know/clinical-effectiveness-terms. 2024© UpToDate, Inc. and its affiliates and/or licensors. All rights reserved.  Copyright   © 2024 UpToDate, Inc. and/or its affiliates. All rights reserved.

## 2024-11-27 NOTE — PROGRESS NOTES
Adult Annual Physical  Name: Mirta Horton      : 1965      MRN: [id number]  Encounter Provider: MELA Carpenter  Encounter Date: 2024   Encounter department: [facility]    Assessment & Plan  Community acquired pneumonia of right middle lobe of lung    Orders:    predniSONE 10 mg tablet; Take 4 tablets (40 mg total) by mouth daily for 2 days, THEN 3 tablets (30 mg total) daily for 2 days, THEN 2 tablets (20 mg total) daily for 2 days, THEN 1 tablet (10 mg total) daily for 2 days.    Other acute sinusitis, recurrence not specified    Orders:    azithromycin (ZITHROMAX) 250 mg tablet; Take 2 tablets today then 1 tablet daily x 4 days    predniSONE 10 mg tablet; Take 4 tablets (40 mg total) by mouth daily for 2 days, THEN 3 tablets (30 mg total) daily for 2 days, THEN 2 tablets (20 mg total) daily for 2 days, THEN 1 tablet (10 mg total) daily for 2 days.    Mixed hyperlipidemia    Orders:    atorvastatin (LIPITOR) 20 mg tablet; Take 1 tablet (20 mg total) by mouth daily at bedtime    Annual wellness visit  Labs to be done in 2 weeks and we will schedule with your routine provider in spring        Immunizations and preventive care screenings were discussed with patient today. Appropriate education was printed on patient's after visit summary.    Counseling:  Labs to be done in 2 weeks          History of Present Illness     Adult Annual Physical:  Patient presents for annual physical. Patient has not been feeling well since since Saturday, had diarrhea and fever, chills, and is using otc meds positive fatigue and malaise .     Diet and Physical Activity:  - Diet/Nutrition: portion control and well balanced diet.  - Exercise: walking and 5-7 times a week on average.    Depression Screening:  - PHQ-2 Score: 0    General Health:  - Sleep: sleeps well and 4-6 hours of sleep on average.  - Hearing: normal hearing bilateral ears.  - Vision: wears contacts, goes for regular eye  "exams and most recent eye exam > 1 year ago.  - Dental:. positive dentures uppers and lowers    /GYN Health:  - Follows with GYN: no.   - History of STDs: no    Review of Systems   Constitutional:  Positive for activity change, appetite change and fatigue. Negative for chills and fever.   HENT:  Positive for ear pain, postnasal drip, rhinorrhea, sinus pressure, sinus pain and sore throat.    Eyes:  Negative for pain and visual disturbance.   Respiratory:  Positive for cough and shortness of breath.    Cardiovascular:  Negative for chest pain and palpitations.   Gastrointestinal:  Negative for abdominal pain and vomiting.   Genitourinary:  Negative for dysuria and hematuria.   Musculoskeletal:  Negative for arthralgias and back pain.   Skin:  Negative for color change and rash.   Neurological:  Negative for seizures and syncope.   All other systems reviewed and are negative.        Objective   /60   Pulse (!) 111   Temp 98 °F (36.7 °C)   Resp 18   Ht 5' 9\" (1.753 m)   Wt 77.6 kg (171 lb)   SpO2 98%   BMI 25.25 kg/m²     Physical Exam  Vitals and nursing note reviewed.   Constitutional:       General: She is not in acute distress.     Appearance: She is well-developed. She is ill-appearing.   HENT:      Head: Normocephalic and atraumatic.      Comments: Positive b/l ears with bulging TM left ear with redness      Right Ear: Ear canal and external ear normal.      Left Ear: Ear canal and external ear normal.      Nose: Congestion and rhinorrhea present.      Mouth/Throat:      Mouth: Mucous membranes are moist.      Pharynx: Oropharyngeal exudate and posterior oropharyngeal erythema present.   Eyes:      Conjunctiva/sclera: Conjunctivae normal.   Cardiovascular:      Rate and Rhythm: Normal rate and regular rhythm.      Heart sounds: No murmur heard.  Pulmonary:      Effort: Pulmonary effort is normal. No respiratory distress.      Comments: Diminished throughout   Abdominal:      Palpations: Abdomen is " soft.      Tenderness: There is no abdominal tenderness.   Musculoskeletal:         General: No swelling.      Cervical back: Neck supple.   Skin:     General: Skin is warm and dry.      Capillary Refill: Capillary refill takes less than 2 seconds.   Neurological:      Mental Status: She is alert and oriented to person, place, and time.   Psychiatric:         Mood and Affect: Mood normal.         Behavior: Behavior normal.         Thought Content: Thought content normal.         Judgment: Judgment normal.

## 2024-12-19 DIAGNOSIS — E78.2 MIXED HYPERLIPIDEMIA: ICD-10-CM

## 2024-12-19 RX ORDER — ATORVASTATIN CALCIUM 20 MG/1
20 TABLET, FILM COATED ORAL
Qty: 30 TABLET | Refills: 0 | Status: SHIPPED | OUTPATIENT
Start: 2024-12-19

## 2025-01-03 DIAGNOSIS — I10 ESSENTIAL HYPERTENSION: ICD-10-CM

## 2025-01-03 RX ORDER — AMLODIPINE BESYLATE 5 MG/1
5 TABLET ORAL DAILY
Qty: 30 TABLET | Refills: 5 | Status: SHIPPED | OUTPATIENT
Start: 2025-01-03

## 2025-01-07 ENCOUNTER — DOCUMENTATION (OUTPATIENT)
Dept: GASTROENTEROLOGY | Facility: CLINIC | Age: 60
End: 2025-01-07

## 2025-01-07 NOTE — PROGRESS NOTES
11/27/2024 primary care office visit  -Community-acquired pneumonia  Placed on prednisone  -Sinusitis  Placed on Zithromax      8/7/2024 colonoscopy  [place] bowel prep score of 3  5 mm sessile polyp hepatic flexure status post cold snare polypectomy.  Tubular adenoma  Suboptimal preparation  Scarring in the proximal transverse colon adjacent to tattoo.  No evidence of residual adenoma.  Repeat colonoscopy 6 to 9 months  Use a standard 4 L preparation at that time    12/4/2023 laboratory data  Sodium 132  Potassium 4.1  BUN 6  Creatinine 0.74  Glucose 120  AST 32  ALT 25  Alk phos 88  Albumin 4.5  WBC 7.73 Hgb 15.6 HCT 45.7 MCV 97 platelet count 310,000  TSH 2.198    6/11/2021 colonoscopy  Adequate preparation  5 mm sessile polyp cecum.  Biopsy unremarkable  5 mm polyp distal ascending colon.  Tubular adenoma  Tattoo and scar proximal transverse colon without residual polyp  Mild diverticulosis  Small external hemorrhoids  Hypertrophied anal papilla  Repeat colonoscopy 3 years recommended    11/13/2020 colonoscopy  2 flat polyps measuring 3 to 8 mm proximal transverse colon removed by cold snare.  Tubular adenoma  6 mm sessile polyp cecum status post cold snare polypectomy.  Tubular adenoma  9 mm sessile polyp mid ascending colon status post cold snare polypectomy.  Tubular adenoma  14 to 15 mm granular lateral spreading type of polyp proximal transverse colon status post piecemeal removal by electrocautery snare.  Tattoo placed 2 cm distal to the lesion.  Tubular adenoma  1 cm pedunculated polyp at 30 cm status post hot snare polypectomy.  Tubular adenoma  Small hemorrhoids    09/10/2020 ultrasound abdomen  Liver 16.5 cm.  Diffuse mildly increased echogenicity.    3 mm gallbladder polyp neck of the gallbladder.    Common bile duct 5 mm.     09/10/2020   AST 35 improved from 49   ALT 44 improved from 61   Alk-phos 88   Total protein 7.7   Albumin 4.1   Celiac antibody panel negative   Hepatitis a total antibody  negative   Hepatitis B surface antigen nonreactive  Hepatitis B surface antibody nonreactive   hepatitis-B core total antibody nonreactive  Hepatitis C antibody nonreactive

## 2025-01-07 NOTE — PROGRESS NOTES
Records reviewed and outlined below in preparation for office visit 1/9/2025;    11/27/2024 primary care office visit  -Community-acquired pneumonia  Placed on prednisone  -Sinusitis  Placed on Zithromax      8/7/2024 colonoscopy  [place] bowel prep score of 3  5 mm sessile polyp hepatic flexure status post cold snare polypectomy.  Tubular adenoma  Suboptimal preparation  Scarring in the proximal transverse colon adjacent to tattoo.  No evidence of residual adenoma.  Repeat colonoscopy 6 to 9 months  Use a standard 4 L preparation at that time    12/4/2023 laboratory data  Sodium 132  Potassium 4.1  BUN 6  Creatinine 0.74  Glucose 120  AST 32  ALT 25  Alk phos 88  Albumin 4.5  WBC 7.73 Hgb 15.6 HCT 45.7 MCV 97 platelet count 310,000  TSH 2.198    6/11/2021 colonoscopy  Adequate preparation  5 mm sessile polyp cecum.  Biopsy unremarkable  5 mm polyp distal ascending colon.  Tubular adenoma  Tattoo and scar proximal transverse colon without residual polyp  Mild diverticulosis  Small external hemorrhoids  Hypertrophied anal papilla  Repeat colonoscopy 3 years recommended    11/13/2020 colonoscopy  2 flat polyps measuring 3 to 8 mm proximal transverse colon removed by cold snare.  Tubular adenoma  6 mm sessile polyp cecum status post cold snare polypectomy.  Tubular adenoma  9 mm sessile polyp mid ascending colon status post cold snare polypectomy.  Tubular adenoma  14 to 15 mm granular lateral spreading type of polyp proximal transverse colon status post piecemeal removal by electrocautery snare.  Tattoo placed 2 cm distal to the lesion.  Tubular adenoma  1 cm pedunculated polyp at 30 cm status post hot snare polypectomy.  Tubular adenoma  Small hemorrhoids    09/10/2020 ultrasound abdomen  Liver 16.5 cm.  Diffuse mildly increased echogenicity.    3 mm gallbladder polyp neck of the gallbladder.    Common bile duct 5 mm.     09/10/2020   AST 35 improved from 49   ALT 44 improved from 61   Alk-phos 88   Total protein 7.7    Albumin 4.1   Celiac antibody panel negative   Hepatitis a total antibody negative   Hepatitis B surface antigen nonreactive  Hepatitis B surface antibody nonreactive   hepatitis-B core total antibody nonreactive  Hepatitis C antibody nonreactive

## 2025-01-09 ENCOUNTER — OFFICE VISIT (OUTPATIENT)
Dept: GASTROENTEROLOGY | Facility: CLINIC | Age: 60
End: 2025-01-09
Payer: COMMERCIAL

## 2025-01-09 VITALS
HEART RATE: 102 BPM | OXYGEN SATURATION: 96 % | WEIGHT: 179.4 LBS | HEIGHT: 69 IN | RESPIRATION RATE: 16 BRPM | DIASTOLIC BLOOD PRESSURE: 82 MMHG | BODY MASS INDEX: 26.57 KG/M2 | SYSTOLIC BLOOD PRESSURE: 140 MMHG | TEMPERATURE: 97.8 F

## 2025-01-09 DIAGNOSIS — K82.4 GALLBLADDER POLYP: ICD-10-CM

## 2025-01-09 DIAGNOSIS — R74.8 ELEVATED LIVER ENZYMES: ICD-10-CM

## 2025-01-09 DIAGNOSIS — Z86.0100 HISTORY OF COLON POLYPS: Primary | ICD-10-CM

## 2025-01-09 DIAGNOSIS — J18.9 RECURRENT PNEUMONIA: ICD-10-CM

## 2025-01-09 DIAGNOSIS — K76.0 HEPATIC STEATOSIS: ICD-10-CM

## 2025-01-09 PROCEDURE — 99214 OFFICE O/P EST MOD 30 MIN: CPT | Performed by: INTERNAL MEDICINE

## 2025-01-09 NOTE — ASSESSMENT & PLAN NOTE
Mirta was noted to have abnormal liver enzymes in the past.  I will repeat liver profile.  Prior hepatitis A, B, and C were negative.  Celiac antibody panel was negative.  Repeat liver profile.  If liver enzymes remain abnormal we will recommend more comprehensive evaluation.  Also this could be related to alcohol use.  Orders:  •  CBC; Future  •  Comprehensive metabolic panel; Future

## 2025-01-09 NOTE — PROGRESS NOTES
Name: Mirta Horton      : 1965      MRN: [id number]  Encounter Provider: Chris Dennis Pascual DO  Encounter Date: 2025   Encounter department: [facility] [place]  :  Assessment & Plan  History of colon polyps  Mirta underwent colonoscopy 2024.  Unfortunately her [place] bowel prep score was 3.  She received a MiraLAX preparation for that procedure.  Trice is already scheduled for a repeat colonoscopy in February.  She received a Colyte preparation split dose with the second dose completed 3 hours prior to arrival.  3 bisacodyl tabletsl    I explained to the patient the procedure of colonoscopy as well as its potential risks which are approximately 3 in 1000 chance of bleeding, infection, and perforation.  Perforation may require a surgeon to repair it.  I also explained the small but significant chance of missing lesions with colonoscopy which has been reported to be about 5-10%.    Mirta was noted to have several colon polyps in the past.  6 were all tubular adenomas.  There is a 14 mm irregular sessile polyp in the proximal ascending colon removed by piecemeal technique with hot snare.  There is a tattoo placed 2 cm distal to this lesion.  She did have 2 polyps measuring 3 to 8 mm in the proximal transverse colon.  There is a 9 mm polyp mid ascending colon and there is a 1 cm pedunculated polyp at 30 cm.  These were all noted on her colonoscopy from 2020.    Repeat colonoscopy in 2021 revealed    Adequate preparation  5 mm sessile polyp cecum.  Biopsy unremarkable  5 mm polyp distal ascending colon.  Tubular adenoma  Tattoo and scar proximal transverse colon without residual polyp  Mild diverticulosis  Small external hemorrhoids  Hypertrophied anal papilla           Gallbladder polyp  Mirta was noted to have a small gallbladder polyp on prior ultrasound.  For completeness would recommend repeating ultrasound abdomen, if this is stable should  not require any further follow-up.  Orders:  •  US abdomen complete; Future    Elevated liver enzymes  Mrita was noted to have abnormal liver enzymes in the past.  I will repeat liver profile.  Prior hepatitis A, B, and C were negative.  Celiac antibody panel was negative.  Repeat liver profile.  If liver enzymes remain abnormal we will recommend more comprehensive evaluation.  Also this could be related to alcohol use.  Orders:  •  CBC; Future  •  Comprehensive metabolic panel; Future    Hepatic steatosis  Patient was noted to have hepatic steatosis on prior imaging.  Repeat ultrasound abdomen  Check liver enzymes  Check CBC  I did discuss that she consider cutting back on alcohol consumption as this is most likely the cause of her hepatic steatosis.  She can also work on weight reduction to try to bring her body mass index below 25.  Orders:  •  US abdomen complete; Future  •  CBC; Future  •  Comprehensive metabolic panel; Future    Recurrent pneumonia  Mirta reports that she has been having pneumonia almost annually since she was 13 years of age.  She also has been having recurrent bronchitis.  For completeness check serum immunoglobulins.  Orders:  •  IgG, IgA, IgM; Future        History of Present Illness   HPI  Mirta Horton is a 59 y.o. female who presents for follow-up of intermittently abnormal liver enzymes and history of colon polyps.  Mirta denies any diarrhea, constipation, bleeding or black stools.  She denies any abdominal pain.  From an upper GI standpoint she denies any heartburn, indigestion, dysphagia, nausea, vomiting, or early satiety.    She does smoke up to 1 pack/day.  She drinks about a sixpack of beer 3 to 4 days a week.    Records reviewed prior to office visit and outlined below    11/27/2024 primary care office visit  -Community-acquired pneumonia  Placed on prednisone  -Sinusitis  Placed on Zithromax        8/7/2024 colonoscopy  [place] bowel prep score of 3  5 mm sessile polyp hepatic  flexure status post cold snare polypectomy.  Tubular adenoma  Suboptimal preparation  Scarring in the proximal transverse colon adjacent to tattoo.  No evidence of residual adenoma.  Repeat colonoscopy 6 to 9 months  Use a standard 4 L preparation at that time     12/4/2023 laboratory data  Sodium 132  Potassium 4.1  BUN 6  Creatinine 0.74  Glucose 120  AST 32  ALT 25  Alk phos 88  Albumin 4.5  WBC 7.73 Hgb 15.6 HCT 45.7 MCV 97 platelet count 310,000  TSH 2.198     6/11/2021 colonoscopy  Adequate preparation  5 mm sessile polyp cecum.  Biopsy unremarkable  5 mm polyp distal ascending colon.  Tubular adenoma  Tattoo and scar proximal transverse colon without residual polyp  Mild diverticulosis  Small external hemorrhoids  Hypertrophied anal papilla  Repeat colonoscopy 3 years recommended     11/13/2020 colonoscopy  2 flat polyps measuring 3 to 8 mm proximal transverse colon removed by cold snare.  Tubular adenoma  6 mm sessile polyp cecum status post cold snare polypectomy.  Tubular adenoma  9 mm sessile polyp mid ascending colon status post cold snare polypectomy.  Tubular adenoma  14 to 15 mm granular lateral spreading type of polyp proximal transverse colon status post piecemeal removal by electrocautery snare.  Tattoo placed 2 cm distal to the lesion.  Tubular adenoma  1 cm pedunculated polyp at 30 cm status post hot snare polypectomy.  Tubular adenoma  Small hemorrhoids     09/10/2020 ultrasound abdomen  Liver 16.5 cm.  Diffuse mildly increased echogenicity.    3 mm gallbladder polyp neck of the gallbladder.    Common bile duct 5 mm.     09/10/2020   AST 35 improved from 49   ALT 44 improved from 61   Alk-phos 88   Total protein 7.7   Albumin 4.1   Celiac antibody panel negative   Hepatitis a total antibody negative   Hepatitis B surface antigen nonreactive  Hepatitis B surface antibody nonreactive   hepatitis-B core total antibody nonreactive  Hepatitis C antibody nonreactive       Review of Systems  Past  Medical History   Past Medical History:   Diagnosis Date   • Anxiety    • Colon polyp    • Hypertension      Past Surgical History:   Procedure Laterality Date   • BREAST CYST EXCISION Right 30yrs     benign   • COLONOSCOPY     • EYE SURGERY     • TUBAL LIGATION       Family History   Problem Relation Age of Onset   • Heart disease Mother    • Hypertension Mother    • Cancer Mother    • Heart disease Father    • Hypertension Father    • Aneurysm Father    • No Known Problems Sister    • No Known Problems Daughter    • No Known Problems Maternal Grandmother    • No Known Problems Paternal Grandmother    • No Known Problems Sister    • No Known Problems Sister    • No Known Problems Daughter    • No Known Problems Maternal Aunt    • No Known Problems Maternal Aunt    • No Known Problems Paternal Aunt       reports that she has been smoking cigarettes. She has a 90 pack-year smoking history. She has never used smokeless tobacco. She reports current alcohol use. She reports that she does not use drugs.  Current Outpatient Medications on File Prior to Visit   Medication Sig Dispense Refill   • amLODIPine (NORVASC) 5 mg tablet take 1 tablet by mouth once daily 30 tablet 5   • atorvastatin (LIPITOR) 20 mg tablet take 1 tablet by mouth daily at bedtime 30 tablet 0   • lisinopril (ZESTRIL) 40 mg tablet Take 1 tablet (40 mg total) by mouth daily 90 tablet 3   • albuterol (ProAir HFA) 90 mcg/act inhaler Inhale 2 puffs every 6 (six) hours as needed for wheezing 8.5 g 5     No current facility-administered medications on file prior to visit.   No Known Allergies   Current Outpatient Medications on File Prior to Visit   Medication Sig Dispense Refill   • amLODIPine (NORVASC) 5 mg tablet take 1 tablet by mouth once daily 30 tablet 5   • atorvastatin (LIPITOR) 20 mg tablet take 1 tablet by mouth daily at bedtime 30 tablet 0   • lisinopril (ZESTRIL) 40 mg tablet Take 1 tablet (40 mg total) by mouth daily 90 tablet 3   • albuterol  "(ProAir HFA) 90 mcg/act inhaler Inhale 2 puffs every 6 (six) hours as needed for wheezing 8.5 g 5     No current facility-administered medications on file prior to visit.      Social History     Tobacco Use   • Smoking status: Every Day     Current packs/day: 2.00     Average packs/day: 2.0 packs/day for 45.0 years (90.0 ttl pk-yrs)     Types: Cigarettes   • Smokeless tobacco: Never   Vaping Use   • Vaping status: Never Used   Substance and Sexual Activity   • Alcohol use: Yes     Comment: 3 beer/day   • Drug use: No   • Sexual activity: Not Currently        Objective   /82 (BP Location: Right arm, Patient Position: Sitting, Cuff Size: Adult)   Pulse 102   Temp 97.8 °F (36.6 °C) (Temporal)   Resp 16   Ht 5' 9\" (1.753 m)   Wt 81.4 kg (179 lb 6.4 oz)   SpO2 96%   BMI 26.49 kg/m²      Physical Exam    General Appearance: Alert, cooperative, no distress  HEENT: Normocephalic, atraumatic, anicteric.   Neck: Supple, symmetrical, trachea midline  Lungs: Clear to auscultation bilaterally; no rales, rhonchi or wheezing; respirations unlabored   Heart: Regular rate and rhythm; no murmur, rub, or gallop.  Abdomen: Soft, bowel sounds normal, non-tender, non-distended; no masses, there is no hepatosplenomegaly. No spider angiomas    Genitalia: Deferred   Rectal: Deferred   Extremities: No cyanosis, clubbing or edema   Skin: No jaundice, rashes, or lesions   Lymph nodes: No palpable cervical lymphadenopathy         "

## 2025-01-09 NOTE — ASSESSMENT & PLAN NOTE
Patient was noted to have hepatic steatosis on prior imaging.  Repeat ultrasound abdomen  Check liver enzymes  Check CBC  I did discuss that she consider cutting back on alcohol consumption as this is most likely the cause of her hepatic steatosis.  She can also work on weight reduction to try to bring her body mass index below 25.  Orders:  •  US abdomen complete; Future  •  CBC; Future  •  Comprehensive metabolic panel; Future

## 2025-01-09 NOTE — PATIENT INSTRUCTIONS
History of colon polyps  Mirta underwent colonoscopy August 7, 2024.  Unfortunately her [place] bowel prep score was 3.  She received a MiraLAX preparation for that procedure.  Trice is already scheduled for a repeat colonoscopy in February.  She received a Colyte preparation split dose with the second dose completed 3 hours prior to arrival.  3 bisacodyl tabletsl    I explained to the patient the procedure of colonoscopy as well as its potential risks which are approximately 3 in 1000 chance of bleeding, infection, and perforation.  Perforation may require a surgeon to repair it.  I also explained the small but significant chance of missing lesions with colonoscopy which has been reported to be about 5-10%.    Mirta was noted to have several colon polyps in the past.  6 were all tubular adenomas.  There is a 14 mm irregular sessile polyp in the proximal ascending colon removed by piecemeal technique with hot snare.  There is a tattoo placed 2 cm distal to this lesion.  She did have 2 polyps measuring 3 to 8 mm in the proximal transverse colon.  There is a 9 mm polyp mid ascending colon and there is a 1 cm pedunculated polyp at 30 cm.  These were all noted on her colonoscopy from November 13, 2020.    Repeat colonoscopy in June 2021 revealed    Adequate preparation  5 mm sessile polyp cecum.  Biopsy unremarkable  5 mm polyp distal ascending colon.  Tubular adenoma  Tattoo and scar proximal transverse colon without residual polyp  Mild diverticulosis  Small external hemorrhoids  Hypertrophied anal papilla           Gallbladder polyp  Mirta was noted to have a small gallbladder polyp on prior ultrasound.  For completeness would recommend repeating ultrasound abdomen, if this is stable should not require any further follow-up.    Orders:    US abdomen complete; Future    Elevated liver enzymes  Mirta was noted to have abnormal liver enzymes in the past.  I will repeat liver profile.  Prior hepatitis A, B, and C were  negative.  Celiac antibody panel was negative.  Repeat liver profile.  If liver enzymes remain abnormal we will recommend more comprehensive evaluation.  Also this could be related to alcohol use.    Orders:    CBC; Future    Comprehensive metabolic panel; Future    Hepatic steatosis  Patient was noted to have hepatic steatosis on prior imaging.  Repeat ultrasound abdomen  Check liver enzymes  Check CBC  I did discuss that she consider cutting back on alcohol consumption as this is most likely the cause of her hepatic steatosis.  She can also work on weight reduction to try to bring her body mass index below 25.    Orders:    US abdomen complete; Future    CBC; Future    Comprehensive metabolic panel; Future    Recurrent pneumonia  Mirta reports that she has been having pneumonia almost annually since she was 13 years of age.  She also has been having recurrent bronchitis.  For completeness check serum immunoglobulins.  Orders:    IgG, IgA, IgM; Future

## 2025-01-09 NOTE — ASSESSMENT & PLAN NOTE
Mirta underwent colonoscopy August 7, 2024.  Unfortunately her [place] bowel prep score was 3.  She received a MiraLAX preparation for that procedure.  Trice is already scheduled for a repeat colonoscopy in February.  She received a Colyte preparation split dose with the second dose completed 3 hours prior to arrival.  3 bisacodyl tabletsl    I explained to the patient the procedure of colonoscopy as well as its potential risks which are approximately 3 in 1000 chance of bleeding, infection, and perforation.  Perforation may require a surgeon to repair it.  I also explained the small but significant chance of missing lesions with colonoscopy which has been reported to be about 5-10%.    Mirta was noted to have several colon polyps in the past.  6 were all tubular adenomas.  There is a 14 mm irregular sessile polyp in the proximal ascending colon removed by piecemeal technique with hot snare.  There is a tattoo placed 2 cm distal to this lesion.  She did have 2 polyps measuring 3 to 8 mm in the proximal transverse colon.  There is a 9 mm polyp mid ascending colon and there is a 1 cm pedunculated polyp at 30 cm.  These were all noted on her colonoscopy from November 13, 2020.    Repeat colonoscopy in June 2021 revealed    Adequate preparation  5 mm sessile polyp cecum.  Biopsy unremarkable  5 mm polyp distal ascending colon.  Tubular adenoma  Tattoo and scar proximal transverse colon without residual polyp  Mild diverticulosis  Small external hemorrhoids  Hypertrophied anal papilla

## 2025-01-09 NOTE — ASSESSMENT & PLAN NOTE
Mirta was noted to have a small gallbladder polyp on prior ultrasound.  For completeness would recommend repeating ultrasound abdomen, if this is stable should not require any further follow-up.  Orders:  •  US abdomen complete; Future

## 2025-01-20 DIAGNOSIS — E78.2 MIXED HYPERLIPIDEMIA: ICD-10-CM

## 2025-01-20 RX ORDER — ATORVASTATIN CALCIUM 20 MG/1
20 TABLET, FILM COATED ORAL
Qty: 90 TABLET | Refills: 1 | Status: SHIPPED | OUTPATIENT
Start: 2025-01-20

## 2025-01-20 NOTE — TELEPHONE ENCOUNTER
Patient requesting refill(s) of: atorvastatin 20 mg daily     Last filled: 12/19/24 #30 x 0  Last appt: 11/24/24  Next appt: 4/16/25  Pharmacy: [facility] [place]

## 2025-02-14 ENCOUNTER — TELEPHONE (OUTPATIENT)
Dept: PREADMISSION TESTING | Facility: HOSPITAL | Age: 60
End: 2025-02-14

## 2025-02-17 ENCOUNTER — TELEPHONE (OUTPATIENT)
Dept: FAMILY MEDICINE CLINIC | Facility: CLINIC | Age: 60
End: 2025-02-17

## 2025-02-27 ENCOUNTER — ANESTHESIA (OUTPATIENT)
Dept: GASTROENTEROLOGY | Facility: HOSPITAL | Age: 60
End: 2025-02-27
Payer: COMMERCIAL

## 2025-02-27 ENCOUNTER — HOSPITAL ENCOUNTER (OUTPATIENT)
Dept: GASTROENTEROLOGY | Facility: HOSPITAL | Age: 60
Setting detail: OUTPATIENT SURGERY
End: 2025-02-27
Attending: INTERNAL MEDICINE
Payer: COMMERCIAL

## 2025-02-27 ENCOUNTER — ANESTHESIA EVENT (OUTPATIENT)
Dept: GASTROENTEROLOGY | Facility: HOSPITAL | Age: 60
End: 2025-02-27
Payer: COMMERCIAL

## 2025-02-27 VITALS
HEART RATE: 94 BPM | TEMPERATURE: 97.3 F | SYSTOLIC BLOOD PRESSURE: 131 MMHG | RESPIRATION RATE: 18 BRPM | OXYGEN SATURATION: 97 % | DIASTOLIC BLOOD PRESSURE: 83 MMHG

## 2025-02-27 DIAGNOSIS — Z86.0100 HX OF COLONIC POLYPS: ICD-10-CM

## 2025-02-27 PROCEDURE — 45385 COLONOSCOPY W/LESION REMOVAL: CPT | Performed by: INTERNAL MEDICINE

## 2025-02-27 PROCEDURE — 88305 TISSUE EXAM BY PATHOLOGIST: CPT | Performed by: PATHOLOGY

## 2025-02-27 RX ORDER — SODIUM CHLORIDE, SODIUM LACTATE, POTASSIUM CHLORIDE, CALCIUM CHLORIDE 600; 310; 30; 20 MG/100ML; MG/100ML; MG/100ML; MG/100ML
125 INJECTION, SOLUTION INTRAVENOUS CONTINUOUS
Status: DISCONTINUED | OUTPATIENT
Start: 2025-02-27 | End: 2025-03-03 | Stop reason: HOSPADM

## 2025-02-27 RX ORDER — PROPOFOL 10 MG/ML
INJECTION, EMULSION INTRAVENOUS CONTINUOUS PRN
Status: DISCONTINUED | OUTPATIENT
Start: 2025-02-27 | End: 2025-02-27

## 2025-02-27 RX ORDER — PROPOFOL 10 MG/ML
INJECTION, EMULSION INTRAVENOUS AS NEEDED
Status: DISCONTINUED | OUTPATIENT
Start: 2025-02-27 | End: 2025-02-27

## 2025-02-27 RX ORDER — SODIUM CHLORIDE, SODIUM LACTATE, POTASSIUM CHLORIDE, CALCIUM CHLORIDE 600; 310; 30; 20 MG/100ML; MG/100ML; MG/100ML; MG/100ML
INJECTION, SOLUTION INTRAVENOUS CONTINUOUS PRN
Status: DISCONTINUED | OUTPATIENT
Start: 2025-02-27 | End: 2025-02-27

## 2025-02-27 RX ORDER — SODIUM CHLORIDE, SODIUM LACTATE, POTASSIUM CHLORIDE, CALCIUM CHLORIDE 600; 310; 30; 20 MG/100ML; MG/100ML; MG/100ML; MG/100ML
20 INJECTION, SOLUTION INTRAVENOUS CONTINUOUS
Status: CANCELLED | OUTPATIENT
Start: 2025-02-27

## 2025-02-27 RX ORDER — GLYCOPYRROLATE 0.2 MG/ML
INJECTION INTRAMUSCULAR; INTRAVENOUS AS NEEDED
Status: DISCONTINUED | OUTPATIENT
Start: 2025-02-27 | End: 2025-02-27

## 2025-02-27 RX ORDER — ONDANSETRON 2 MG/ML
INJECTION INTRAMUSCULAR; INTRAVENOUS AS NEEDED
Status: DISCONTINUED | OUTPATIENT
Start: 2025-02-27 | End: 2025-02-27

## 2025-02-27 RX ADMIN — SODIUM CHLORIDE, SODIUM LACTATE, POTASSIUM CHLORIDE, AND CALCIUM CHLORIDE: .6; .31; .03; .02 INJECTION, SOLUTION INTRAVENOUS at 07:56

## 2025-02-27 RX ADMIN — GLYCOPYRROLATE 0.1 MG: 0.2 INJECTION INTRAMUSCULAR; INTRAVENOUS at 08:01

## 2025-02-27 RX ADMIN — SODIUM CHLORIDE, SODIUM LACTATE, POTASSIUM CHLORIDE, AND CALCIUM CHLORIDE 125 ML/HR: .6; .31; .03; .02 INJECTION, SOLUTION INTRAVENOUS at 07:40

## 2025-02-27 RX ADMIN — ONDANSETRON 4 MG: 2 INJECTION INTRAMUSCULAR; INTRAVENOUS at 08:15

## 2025-02-27 RX ADMIN — PROPOFOL 100 MG: 10 INJECTION, EMULSION INTRAVENOUS at 08:01

## 2025-02-27 RX ADMIN — PROPOFOL 120 MCG/KG/MIN: 10 INJECTION, EMULSION INTRAVENOUS at 08:01

## 2025-02-27 NOTE — ANESTHESIA PREPROCEDURE EVALUATION
Procedure:  COLONOSCOPY    Relevant Problems   ANESTHESIA (within normal limits)      CARDIO   (+) CAD (coronary artery disease)   (+) Essential hypertension   (-) MI (myocardial infarction) (HCC)      ENDO (within normal limits)      GI/HEPATIC  NPO confirmed  BMI 26.5   (+) Hepatic steatosis      /RENAL (within normal limits)      NEURO/PSYCH   (+) Anxiety   (-) CVA (cerebral vascular accident) (HCC)   (-) Seizures (HCC)      PULMONARY   (+) Smoking   (-) Sleep apnea   (-) URI (upper respiratory infection)      No Known Allergies    Social History     Tobacco Use    Smoking status: Every Day     Current packs/day: 2.00     Average packs/day: 2.0 packs/day for 45.0 years (90.0 ttl pk-yrs)     Types: Cigarettes    Smokeless tobacco: Never   Vaping Use    Vaping status: Never Used   Substance Use Topics    Alcohol use: Yes     Comment: 3 beer/day    Drug use: No     Current Outpatient Medications   Medication Instructions    albuterol (ProAir HFA) 90 mcg/act inhaler 2 puffs, Inhalation, Every 6 hours PRN    amLODIPine (NORVASC) 5 mg, Oral, Daily    atorvastatin (LIPITOR) 20 mg, Oral, Daily at bedtime    lisinopril (ZESTRIL) 40 mg, Oral, Daily    polyethylene glycol (COLYTE) 4000 mL solution 4,000 mL, Oral, Once, Take as directed and split dose with the second dose completed 3 hours prior to arrival.  Please see colonoscopy preparation instructions.    VITAMIN D, CHOLECALCIFEROL, PO Daily     Lab Results   Component Value Date    WBC 7.73 12/04/2023    HGB 15.6 (H) 12/04/2023    HCT 45.7 12/04/2023     12/04/2023    SODIUM 132 (L) 12/04/2023    K 4.1 12/04/2023    CL 97 12/04/2023    CO2 24 12/04/2023    BUN 6 12/04/2023    CREATININE 0.74 12/04/2023    HGBA1C 5.3 07/16/2020    AST 32 12/04/2023    ALT 25 12/04/2023    ALKPHOS 88 12/04/2023    TBILI 0.41 12/04/2023    ALB 4.5 12/04/2023     Vitals:    02/27/25 0732   BP: 155/76   Pulse: 95   Resp: 18   Temp: (!) 97.3 °F (36.3 °C)   SpO2: 97%     EKG  12/4/23  Sinus tachycardia rate 124, right atrial enlargement, no prior to compare.    Physical Exam    Airway    Mallampati score: II  TM Distance: >3 FB  Neck ROM: full     Dental   Comment: Denies loose/chipped teeth, No notable dental hx     Cardiovascular  Rhythm: regular, Rate: normal, Cardiovascular exam normal    Pulmonary  Pulmonary exam normal Breath sounds clear to auscultation    Other Findings  post-pubertal.      Anesthesia Plan  ASA Score- 2     Anesthesia Type- IV sedation with anesthesia with ASA Monitors.         Additional Monitors:     Airway Plan:     Comment: O2 mask, natural airway, EtCO2 monitor. Risks discussed including awareness, aspiration, drug reactions and conversion to GA..       Plan Factors-Exercise tolerance (METS): >4 METS.    Chart reviewed. EKG reviewed.  Existing labs reviewed. Patient summary reviewed.    Patient is not a current smoker.              Induction- intravenous.    Postoperative Plan-         Informed Consent- Anesthetic plan and risks discussed with patient.  I personally reviewed this patient with the CRNA. Discussed and agreed on the Anesthesia Plan with the CRNA..    NPO Status:  Vitals Value Taken Time   Date of last liquid 02/27/25 02/27/25 0733   Time of last liquid 0200 02/27/25 0733   Date of last solid 02/25/25 02/27/25 0733   Time of last solid 0700 02/27/25 0733

## 2025-02-27 NOTE — DISCHARGE INSTR - AVS FIRST PAGE
Suggest the addition of a fiber supplement such as Benefiber fiber 2  tsp fulls mixed in 6 to 8 oz of water per day or  Citrucel powder 1 heaping tablespoonful daily mixed in 6-8 ounces of water daily or any other fiber supplement of your choice.  You could consider Citrucel capsules 2 capsules twice a day with 8 oz of water provided you do not have any difficulty swallowing or narrowing in your esophagus.  -Please call the Gastroenterology office at [phone] for biopsy results if you do  not hear from our office in 14 days.  -Follow-up with primary care doctor as previously scheduled  -Please call gastroenterology physician on call or go to the emergency department if you develop abdominal pain, rectal bleeding greater than 1 tbsp, black stools, fever greater than 100.5, persistent nausea or vomiting.  Gastroenterology office phone number is [phone].

## 2025-02-27 NOTE — ANESTHESIA POSTPROCEDURE EVALUATION
Post-Op Assessment Note    CV Status:  Stable  Pain Score: 0    Pain management: adequate       Mental Status:  Alert and awake   Hydration Status:  Euvolemic   PONV Controlled:  Controlled   Airway Patency:  Patent     Post Op Vitals Reviewed: Yes    No anethesia notable event occurred.    Staff: CRNA           Last Filed PACU Vitals:  Vitals Value Taken Time   Temp 98    Pulse 93    /80    Resp 12    SpO2 98

## 2025-02-27 NOTE — H&P
History and Physical -  [facility]  Mirta Horton 59 y.o. female MRN: [id number]                  HPI: Mirta Horton is a 59 y.o. year old female who presents for colonoscopy.  Please refer to office note dated 1/9/2025 for details of her medical history.  She was noted to have a suboptimal preparation August 7, 2024.  She presents today after taking a more extended preparation and a standard 4 L preparation.          REVIEW OF SYSTEMS: Per the HPI, and otherwise unremarkable.    Historical Information   Past Medical History:   Diagnosis Date    Anxiety     Colon polyp     Hyperlipidemia     Hypertension      Past Surgical History:   Procedure Laterality Date    BREAST CYST EXCISION Right 30yrs     benign    COLONOSCOPY      EYE SURGERY      TUBAL LIGATION       Social History   Social History     Substance and Sexual Activity   Alcohol Use Yes    Comment: 3 beer/day     Social History     Substance and Sexual Activity   Drug Use No     Social History     Tobacco Use   Smoking Status Every Day    Current packs/day: 2.00    Average packs/day: 2.0 packs/day for 45.0 years (90.0 ttl pk-yrs)    Types: Cigarettes   Smokeless Tobacco Never     Family History   Problem Relation Age of Onset    Heart disease Mother     Hypertension Mother     Cancer Mother     Heart disease Father     Hypertension Father     Aneurysm Father     No Known Problems Sister     No Known Problems Daughter     No Known Problems Maternal Grandmother     No Known Problems Paternal Grandmother     No Known Problems Sister     No Known Problems Sister     No Known Problems Daughter     No Known Problems Maternal Aunt     No Known Problems Maternal Aunt     No Known Problems Paternal Aunt        Meds/Allergies       Current Outpatient Medications:     amLODIPine (NORVASC) 5 mg tablet    atorvastatin (LIPITOR) 20 mg tablet    lisinopril (ZESTRIL) 40 mg tablet    VITAMIN D, CHOLECALCIFEROL, PO    albuterol (ProAir HFA) 90 mcg/act inhaler     polyethylene glycol (COLYTE) 4000 mL solution    Current Facility-Administered Medications:     lactated ringers infusion, 125 mL/hr, Intravenous, Continuous, 125 mL/hr at 02/27/25 0740    No Known Allergies    Objective     /76   Pulse 95   Temp (!) 97.3 °F (36.3 °C) (Temporal)   Resp 18   SpO2 97%       PHYSICAL EXAM    Gen: NAD  Head: NCAT  CV: RRR  CHEST: Clear  ABD: soft, NT/ND  EXT: no edema      ASSESSMENT/PLAN:  This is a 59 y.o. year old female here for colonoscopy, and she is stable and optimized for her procedure.

## 2025-03-03 ENCOUNTER — RESULTS FOLLOW-UP (OUTPATIENT)
Dept: GASTROENTEROLOGY | Facility: CLINIC | Age: 60
End: 2025-03-03

## 2025-03-03 PROCEDURE — 88305 TISSUE EXAM BY PATHOLOGIST: CPT | Performed by: PATHOLOGY

## 2025-03-05 DIAGNOSIS — I10 ESSENTIAL HYPERTENSION: ICD-10-CM

## 2025-03-05 RX ORDER — LISINOPRIL 40 MG/1
40 TABLET ORAL DAILY
Qty: 90 TABLET | Refills: 3 | Status: SHIPPED | OUTPATIENT
Start: 2025-03-05

## 2025-03-05 NOTE — TELEPHONE ENCOUNTER
Patient requesting refill(s) of: Lisinopril 40 mg     Last filled: 2/26/24  Last appt: 11/27/24  Next appt:4/16/25  Pharmacy: [facility] [place]

## 2025-04-22 ENCOUNTER — TELEPHONE (OUTPATIENT)
Dept: FAMILY MEDICINE CLINIC | Facility: CLINIC | Age: 60
End: 2025-04-22

## 2025-04-23 ENCOUNTER — TELEPHONE (OUTPATIENT)
Dept: FAMILY MEDICINE CLINIC | Facility: CLINIC | Age: 60
End: 2025-04-23

## 2025-06-25 DIAGNOSIS — I10 ESSENTIAL HYPERTENSION: ICD-10-CM

## 2025-06-25 RX ORDER — AMLODIPINE BESYLATE 5 MG/1
5 TABLET ORAL DAILY
Qty: 30 TABLET | Refills: 5 | Status: CANCELLED | OUTPATIENT
Start: 2025-06-25

## 2025-06-25 NOTE — TELEPHONE ENCOUNTER
Requested Prescriptions     Pending Prescriptions Disp Refills    amLODIPine (NORVASC) 5 mg tablet 30 tablet 5     Sig: Take 1 tablet (5 mg total) by mouth daily    lisinopril (ZESTRIL) 40 mg tablet 90 tablet 3     Sig: Take 1 tablet (40 mg total) by mouth daily     [id number] - [place] - [address] [place] [address] [phone]

## 2025-06-25 NOTE — TELEPHONE ENCOUNTER
Patient called to cancel her request for the refill of Amlodipine. States the pharmacy has a refill on file and will get it ready for her. Refill request has been cancelled.

## 2025-07-03 ENCOUNTER — TELEPHONE (OUTPATIENT)
Age: 60
End: 2025-07-03

## 2025-07-03 NOTE — TELEPHONE ENCOUNTER
[facility] Patient requested 90 day supply for everything. Just had everything filled at [facility]. Nothing presently due.    Can a med list be faxed to them?    Phone for them is : [phone]  Best fax for them is: [phone]

## 2025-07-09 RX ORDER — LISINOPRIL 40 MG/1
40 TABLET ORAL DAILY
Qty: 90 TABLET | Refills: 0 | Status: SHIPPED | OUTPATIENT
Start: 2025-07-09

## 2025-07-24 ENCOUNTER — OFFICE VISIT (OUTPATIENT)
Dept: FAMILY MEDICINE CLINIC | Facility: CLINIC | Age: 60
End: 2025-07-24
Payer: COMMERCIAL

## 2025-07-24 VITALS
TEMPERATURE: 97.6 F | WEIGHT: 178.7 LBS | BODY MASS INDEX: 26.47 KG/M2 | HEART RATE: 97 BPM | RESPIRATION RATE: 18 BRPM | DIASTOLIC BLOOD PRESSURE: 80 MMHG | HEIGHT: 69 IN | SYSTOLIC BLOOD PRESSURE: 124 MMHG | OXYGEN SATURATION: 98 %

## 2025-07-24 DIAGNOSIS — Z12.31 ENCOUNTER FOR SCREENING MAMMOGRAM FOR BREAST CANCER: ICD-10-CM

## 2025-07-24 DIAGNOSIS — K76.0 HEPATIC STEATOSIS: ICD-10-CM

## 2025-07-24 DIAGNOSIS — R87.613 HSIL (HIGH GRADE SQUAMOUS INTRAEPITHELIAL LESION) ON PAP SMEAR OF CERVIX: ICD-10-CM

## 2025-07-24 DIAGNOSIS — Z12.4 SCREENING FOR CERVICAL CANCER: ICD-10-CM

## 2025-07-24 DIAGNOSIS — I10 ESSENTIAL HYPERTENSION: Primary | ICD-10-CM

## 2025-07-24 DIAGNOSIS — E78.2 MIXED HYPERLIPIDEMIA: ICD-10-CM

## 2025-07-24 PROBLEM — IMO0001 SMOKING: Status: RESOLVED | Noted: 2024-08-07 | Resolved: 2025-07-24

## 2025-07-24 PROCEDURE — 99214 OFFICE O/P EST MOD 30 MIN: CPT | Performed by: INTERNAL MEDICINE

## 2025-07-24 RX ORDER — AMLODIPINE BESYLATE 5 MG/1
5 TABLET ORAL DAILY
Qty: 90 TABLET | Refills: 2 | Status: SHIPPED | OUTPATIENT
Start: 2025-07-24

## 2025-07-24 RX ORDER — ATORVASTATIN CALCIUM 20 MG/1
20 TABLET, FILM COATED ORAL
Qty: 90 TABLET | Refills: 2 | Status: SHIPPED | OUTPATIENT
Start: 2025-07-24

## 2025-07-24 NOTE — ASSESSMENT & PLAN NOTE
Pap August 2020 showed HSIL, she didn't follow up with Gyn. Discussed need for follow up, possible colpo. Pt agreeable.   Orders:  •  Ambulatory Referral to Obstetrics / Gynecology; Future

## 2025-07-24 NOTE — ASSESSMENT & PLAN NOTE
Bp controlled with amlodipine, lisinopril   Orders:  •  amLODIPine (NORVASC) 5 mg tablet; Take 1 tablet (5 mg total) by mouth daily  •  Comprehensive metabolic panel; Future  •  Hemoglobin A1C; Future

## 2025-07-24 NOTE — PATIENT INSTRUCTIONS
Please get blood work done today  Please schedule with Gyn, as your last pap smear showed pre-cancerous changes

## 2025-07-24 NOTE — ASSESSMENT & PLAN NOTE
Last US in 2020, showing fatty infiltration, likely from alcohol. Discussed cutting down or abstaining. Repeat US ordered        Orders:  •  Lipid Panel with Direct LDL reflex; Future  •  Comprehensive metabolic panel; Future  •  CBC and differential; Future  •  Hemoglobin A1C; Future

## 2025-07-24 NOTE — PROGRESS NOTES
Name: Mirta Horton      : 1965      MRN: [id number]  Encounter Provider: Vickie Garcia MD  Encounter Date: 2025   Encounter department: [facility]  :  Assessment & Plan  Essential hypertension  Bp controlled with amlodipine, lisinopril   Orders:  •  amLODIPine (NORVASC) 5 mg tablet; Take 1 tablet (5 mg total) by mouth daily  •  Comprehensive metabolic panel; Future  •  Hemoglobin A1C; Future    Mixed hyperlipidemia    Orders:  •  atorvastatin (LIPITOR) 20 mg tablet; Take 1 tablet (20 mg total) by mouth daily at bedtime  •  Lipid Panel with Direct LDL reflex; Future  •  Hemoglobin A1C; Future    Encounter for screening mammogram for breast cancer    Orders:  •  Mammo screening bilateral w 3d and cad; Future    Screening for cervical cancer    Orders:  •  Ambulatory Referral to Obstetrics / Gynecology; Future    Hepatic steatosis  Last US in , showing fatty infiltration, likely from alcohol. Discussed cutting down or abstaining. Repeat US ordered        Orders:  •  Lipid Panel with Direct LDL reflex; Future  •  Comprehensive metabolic panel; Future  •  CBC and differential; Future  •  Hemoglobin A1C; Future    HSIL (high grade squamous intraepithelial lesion) on Pap smear of cervix  Pap 2020 showed HSIL, she didn't follow up with Gyn. Discussed need for follow up, possible colpo. Pt agreeable.   Orders:  •  Ambulatory Referral to Obstetrics / Gynecology; Future           History of Present Illness   58yo female with HTN, HLD, hepatic steatosis here for follow up.     Doing well, no concerns today.     Not interested in liver US or lung cancer screening right now.       Review of Systems   Constitutional:  Negative for appetite change, chills, fatigue and fever.   Respiratory:  Negative for chest tightness and shortness of breath.    Cardiovascular:  Negative for chest pain, palpitations and leg swelling.   Gastrointestinal:  Negative for abdominal pain,  "constipation, diarrhea, nausea and vomiting.   Genitourinary:  Negative for difficulty urinating, vaginal bleeding and vaginal discharge.   Neurological:  Negative for dizziness, light-headedness and headaches.       Objective   /80   Pulse 97   Temp 97.6 °F (36.4 °C)   Resp 18   Ht 5' 9\" (1.753 m)   Wt 81.1 kg (178 lb 11.2 oz)   SpO2 98%   BMI 26.39 kg/m²      Physical Exam  Vitals reviewed.   Constitutional:       General: She is not in acute distress.     Appearance: She is normal weight.     Cardiovascular:      Rate and Rhythm: Normal rate and regular rhythm.      Heart sounds: No murmur heard.     No friction rub. No gallop.   Pulmonary:      Effort: Pulmonary effort is normal. No respiratory distress.      Breath sounds: No wheezing, rhonchi or rales.     Neurological:      General: No focal deficit present.      Mental Status: She is alert.      Motor: No weakness.      Gait: Gait normal.     Psychiatric:         Mood and Affect: Mood normal.         Behavior: Behavior normal.         "